# Patient Record
Sex: MALE | Race: WHITE | Employment: FULL TIME | URBAN - METROPOLITAN AREA
[De-identification: names, ages, dates, MRNs, and addresses within clinical notes are randomized per-mention and may not be internally consistent; named-entity substitution may affect disease eponyms.]

---

## 2017-02-20 ENCOUNTER — HOSPITAL ENCOUNTER (OUTPATIENT)
Dept: LAB | Age: 52
Discharge: HOME | End: 2017-02-20

## 2017-02-20 LAB
A-G RATIO,AGRAT: 2 RATIO (ref 1.1–2.6)
ALBUMIN SERPL-MCNC: 4.3 G/DL (ref 3.5–5)
ALP SERPL-CCNC: 87 U/L (ref 25–115)
ALT SERPL-CCNC: 24 U/L (ref 5–40)
ANION GAP SERPL CALC-SCNC: 16 MMOL/L
AST SERPL W P-5'-P-CCNC: 25 U/L (ref 10–37)
BILIRUB SERPL-MCNC: 0.3 MG/DL (ref 0.2–1.2)
BUN SERPL-MCNC: 12 MG/DL (ref 6–22)
CALCIUM SERPL-MCNC: 9.3 MG/DL (ref 8.4–10.4)
CHLORIDE SERPL-SCNC: 100 MMOL/L (ref 98–110)
CHOLEST SERPL-MCNC: 193 MG/DL (ref 110–200)
CO2 SERPL-SCNC: 25 MMOL/L (ref 20–32)
CREAT SERPL-MCNC: 0.8 MG/DL (ref 0.5–1.2)
GFRAA, 66117: >60
GFRNA, 66118: >60
GLOBULIN,GLOB: 2.1 G/DL (ref 2–4)
GLUCOSE SERPL-MCNC: 106 MG/DL (ref 65–99)
HDLC SERPL-MCNC: 34 MG/DL (ref 40–59)
LDLC SERPL CALC-MCNC: 113 MG/DL (ref 50–99)
POTASSIUM SERPL-SCNC: 4.6 MMOL/L (ref 3.5–5.5)
PROT SERPL-MCNC: 6.4 G/DL (ref 6.4–8.3)
SENTARA SPECIMEN COL,SENBCF: NORMAL
SODIUM SERPL-SCNC: 141 MMOL/L (ref 133–145)
TRIGL SERPL-MCNC: 232 MG/DL (ref 40–149)
VLDLC SERPL CALC-MCNC: 46 MG/DL (ref 8–30)

## 2017-02-20 PROCEDURE — 99001 SPECIMEN HANDLING PT-LAB: CPT | Performed by: INTERNAL MEDICINE

## 2017-02-27 ENCOUNTER — OFFICE VISIT (OUTPATIENT)
Dept: INTERNAL MEDICINE CLINIC | Age: 52
End: 2017-02-27

## 2017-02-27 VITALS
HEART RATE: 53 BPM | DIASTOLIC BLOOD PRESSURE: 58 MMHG | WEIGHT: 217 LBS | SYSTOLIC BLOOD PRESSURE: 117 MMHG | TEMPERATURE: 98.2 F | RESPIRATION RATE: 20 BRPM | HEIGHT: 68 IN | BODY MASS INDEX: 32.89 KG/M2 | OXYGEN SATURATION: 98 %

## 2017-02-27 DIAGNOSIS — E66.09 NON MORBID OBESITY DUE TO EXCESS CALORIES: ICD-10-CM

## 2017-02-27 DIAGNOSIS — E78.5 DYSLIPIDEMIA: ICD-10-CM

## 2017-02-27 DIAGNOSIS — E78.00 HIGH CHOLESTEROL: ICD-10-CM

## 2017-02-27 DIAGNOSIS — E55.9 VITAMIN D DEFICIENCY: ICD-10-CM

## 2017-02-27 DIAGNOSIS — Z12.5 SCREENING PSA (PROSTATE SPECIFIC ANTIGEN): ICD-10-CM

## 2017-02-27 DIAGNOSIS — I10 ESSENTIAL HYPERTENSION WITH GOAL BLOOD PRESSURE LESS THAN 140/90: ICD-10-CM

## 2017-02-27 DIAGNOSIS — F51.01 PRIMARY INSOMNIA: ICD-10-CM

## 2017-02-27 DIAGNOSIS — I10 ESSENTIAL HYPERTENSION: Primary | ICD-10-CM

## 2017-02-27 RX ORDER — GUAIFENESIN 100 MG/5ML
81 LIQUID (ML) ORAL DAILY
COMMUNITY
End: 2018-08-08

## 2017-02-27 RX ORDER — BISMUTH SUBSALICYLATE 262 MG
1 TABLET,CHEWABLE ORAL DAILY
COMMUNITY

## 2017-02-27 NOTE — PROGRESS NOTES
Patient is in the office today for a 6 month follow up. Patient states he is having trouble staying asleep. Patient states he has tried Melatonin, and  Advil PM and they do not help. Do you have an Advance Directive yes- will bring copy      1. Have you been to the ER, urgent care clinic since your last visit? Hospitalized since your last visit? No    2. Have you seen or consulted any other health care providers outside of the [address] [place] [address] since your last visit? Include any pap smears or colon screening.  No

## 2017-02-27 NOTE — PATIENT INSTRUCTIONS

## 2017-02-27 NOTE — MR AVS SNAPSHOT
Visit Information Date & Time Provider Department Dept. Phone Encounter #  
 2/27/2017  4:00 PM José Miguel Terrell MD [facility] at [facility] [id number] Follow-up Instructions Return in about 6 months (around 8/27/2017) for labs 1 week before. Upcoming Health Maintenance Date Due Hepatitis C Screening 1965 FOBT Q 1 YEAR AGE 50-75 1/25/2015 INFLUENZA AGE 9 TO ADULT 8/1/2016 DTaP/Tdap/Td series (2 - Td) 4/23/2022 Allergies as of 2/27/2017  Review Complete On: 2/27/2017 By: José Miguel Terrell MD  
  
 Severity Noted Reaction Type Reactions Statins-hmg-coa Reductase Inhibitors  04/13/2011    Myalgia Tricor [Fenofibrate Micronized]  12/02/2011    Myalgia Current Immunizations  Reviewed on 8/17/2012 Name Date PPD 8/8/2012 TDAP Vaccine 4/23/2012 Not reviewed this visit You Were Diagnosed With   
  
 Codes Comments Essential hypertension    -  Primary ICD-10-CM: I10 
ICD-9-CM: 401.9 Dyslipidemia     ICD-10-CM: E78.5 ICD-9-CM: 272.4 Primary insomnia     ICD-10-CM: F51.01 
ICD-9-CM: 307.42 Screening PSA (prostate specific antigen)     ICD-10-CM: Z12.5 ICD-9-CM: V76.44 Vitals BP  
  
  
  
  
  
 117/58 (BP 1 Location: Left arm, BP Patient Position: Sitting) Vitals History BMI and BSA Data Body Mass Index Body Surface Area  
 32.99 kg/m 2 2.17 m 2 Preferred Pharmacy Pharmacy Name Phone [facility] [address] [facility] [phone] Your Updated Medication List  
  
   
This list is accurate as of: 2/27/17  4:20 PM.  Always use your most recent med list.  
  
  
  
  
 aspirin 81 mg chewable tablet Take 81 mg by mouth daily. atenolol 50 mg tablet Commonly known as:  TENORMIN  
TAKE ONE TABLET BY MOUTH DAILY  
  
 multivitamin tablet Commonly known as:  ONE A DAY Take 1 Tab by mouth daily. VITAMIN D3 2,000 unit Tab Generic drug:  cholecalciferol (vitamin D3) Take  by mouth. Follow-up Instructions Return in about 6 months (around 8/27/2017) for labs 1 week before. To-Do List   
 02/27/2017 Lab:  PROSTATE SPECIFIC AG (PSA) Patient Instructions Heart-Healthy Diet: Care Instructions Your Care Instructions A heart-healthy diet has lots of vegetables, fruits, nuts, beans, and whole grains, and is low in salt. It limits foods that are high in saturated fat, such as meats, cheeses, and fried foods. It may be hard to change your diet, but even small changes can lower your risk of heart attack and heart disease. Follow-up care is a key part of your treatment and safety. Be sure to make and go to all appointments, and call your doctor if you are having problems. It's also a good idea to know your test results and keep a list of the medicines you take. How can you care for yourself at home? Watch your portions · Learn what a serving is. A \"serving\" and a \"portion\" are not always the same thing. Make sure that you are not eating larger portions than are recommended. For example, a serving of pasta is ½ cup. A serving size of meat is 2 to 3 ounces. A 3-ounce serving is about the size of a deck of cards. Measure serving sizes until you are good at [place]" them. Keep in mind that restaurants often serve portions that are 2 or 3 times the size of one serving. · To keep your energy level up and keep you from feeling hungry, eat often but in smaller portions. · Eat only the number of calories you need to stay at a healthy weight. If you need to lose weight, eat fewer calories than your body burns (through exercise and other physical activity). Eat more fruits and vegetables · Eat a variety of fruit and vegetables every day. Dark green, deep orange, red, or yellow fruits and vegetables are especially good for you. Examples include spinach, carrots, peaches, and berries. · Keep carrots, celery, and other veggies handy for snacks. Buy fruit that is in season and store it where you can see it so that you will be tempted to eat it. · Cook dishes that have a lot of veggies in them, such as stir-fries and soups. Limit saturated and trans fat · Read food labels, and try to avoid saturated and trans fats. They increase your risk of heart disease. Trans fat is found in many processed foods such as cookies and crackers. · Use olive or canola oil when you cook. Try cholesterol-lowering spreads, such as Benecol or Take Control. · Bake, broil, grill, or steam foods instead of frying them. · Choose lean meats instead of high-fat meats such as hot dogs and sausages. Cut off all visible fat when you prepare meat. · Eat fish, skinless poultry, and meat alternatives such as soy products instead of high-fat meats. Soy products, such as tofu, may be especially good for your heart. · Choose low-fat or fat-free milk and dairy products. Eat fish · Eat at least two servings of fish a week. Certain fish, such as salmon and tuna, contain omega-3 fatty acids, which may help reduce your risk of heart attack. Eat foods high in fiber · Eat a variety of grain products every day. Include whole-grain foods that have lots of fiber and nutrients. Examples of whole-grain foods include oats, whole wheat bread, and brown rice. · Buy whole-grain breads and cereals, instead of white bread or pastries. Limit salt and sodium · Limit how much salt and sodium you eat to help lower your blood pressure. · Taste food before you salt it. Add only a little salt when you think you need it. With time, your taste buds will adjust to less salt. · Eat fewer snack items, fast foods, and other high-salt, processed foods. Check food labels for the amount of sodium in packaged foods. · Choose low-sodium versions of canned goods (such as soups, vegetables, and beans). Limit sugar · Limit drinks and foods with added sugar. These include candy, desserts, and soda pop. Limit alcohol · Limit alcohol to no more than 2 drinks a day for men and 1 drink a day for women. Too much alcohol can cause health problems. When should you call for help? Watch closely for changes in your health, and be sure to contact your doctor if: 
· You would like help planning heart-healthy meals. Where can you learn more? Go to http://marita-jessica.info/. Enter V137 in the search box to learn more about \"Heart-Healthy Diet: Care Instructions. \" Current as of: January 27, 2016 Content Version: 11.1 © 1708-1612 [employer]. Care instructions adapted under license by [employer] (which disclaims liability or warranty for this information). If you have questions about a medical condition or this instruction, always ask your healthcare professional. [address] any warranty or liability for your use of this information. Introducing [facility]! Dear Alf Ramesh: 
Thank you for requesting a [employer] account. Our records indicate that you already have an active [employer] account. You can access your account anytime at [email or web address] Did you know that you can access your hospital and ER discharge instructions at any time in [employer]? You can also review all of your test results from your hospital stay or ER visit. Additional Information If you have questions, please visit the Frequently Asked Questions section of the [employer] website at [email or web address] Remember, [employer] is NOT to be used for urgent needs. For medical emergencies, dial 911. Now available from your iPhone and Android! Please provide this summary of care documentation to your next provider. Your primary care clinician is listed as CAROLEE BECERRA.  If you have any questions after today's visit, please call [phone].

## 2017-02-28 LAB — PSA SERPL-MCNC: 0.6 NG/ML

## 2017-02-28 NOTE — PROGRESS NOTES
Subjective:       Chief Complaint  The patient presents for follow up of hypertension and high cholesterol. Obesity, vit D deficiency         HPI  Mauricio Delgado is a 46 y.o. male seen for follow up of hyperlipidemia. Healso has hypertension. Hypertension well controlled, no significant medication side effects noted, on Tenormin, hyperlipidemia much improved with about 70 lb weight loss, pt unable to tolerate statins. He is currently losing weight in a weight loss program that he attends 1x/week and gets Vit B 12 injections and nutrition advise. Diet and Lifestyle: generally follows a low fat low cholesterol diet, exercises regularly    Home BP Monitoring: is not measured at home. Other symptoms and concerns: Pt's vit D level was improving on vit D 2000 units/day. Will check levels next OV    Discussed the patient's BMI with him. The BMI follow up plan is as follows: BMI is out of normal parameters and plan is as follows: I have counseled this patient on diet and exercise regimens. Current Outpatient Prescriptions   Medication Sig    multivitamin (ONE A DAY) tablet Take 1 Tab by mouth daily.  aspirin 81 mg chewable tablet Take 81 mg by mouth daily.  atenolol (TENORMIN) 50 mg tablet TAKE ONE TABLET BY MOUTH DAILY    cholecalciferol, vitamin D3, (VITAMIN D3) 2,000 unit tab Take  by mouth. No current facility-administered medications for this visit.               Review of Systems  Respiratory: negative for dyspnea on exertion  Cardiovascular: negative for chest pain    Objective:     Visit Vitals    /58 (BP 1 Location: Left arm, BP Patient Position: Sitting)    Pulse (!) 53    Temp 98.2 °F (36.8 °C) (Oral)    Resp 20    Ht 5' 8\" (1.727 m)    Wt 217 lb (98.4 kg)    SpO2 98%    BMI 32.99 kg/m2        General appearance - alert, well appearing, and in no distress  Neck - supple, no significant adenopathy, carotids upstroke normal bilaterally, no bruits  Chest - clear to auscultation, no wheezes, rales or rhonchi, symmetric air entry  Heart - normal rate, regular rhythm, normal S1, S2, no murmurs, rubs, clicks or gallops  Extremities - peripheral pulses normal, no pedal edema, no clubbing or cyanosis  Skin - normal coloration and turgor, no rashes, no suspicious skin lesions noted      Labs:   Lab Results   Component Value Date/Time    Cholesterol, total 193 02/20/2017 06:33 AM    HDL Cholesterol 34 02/20/2017 06:33 AM    LDL,Direct 146 02/15/2016 08:20 AM    LDL, calculated 113 02/20/2017 06:33 AM    Triglyceride 232 02/20/2017 06:33 AM     Lab Results   Component Value Date/Time    ALT (SGPT) 24 02/20/2017 06:33 AM    AST (SGOT) 25 02/20/2017 06:33 AM    Alk. phosphatase 87 02/20/2017 06:33 AM    Bilirubin, total 0.3 02/20/2017 06:33 AM     Lab Results   Component Value Date/Time    GFR est  06/29/2015 07:51 AM    GFR est non- 06/29/2015 07:51 AM    Creatinine 0.8 02/20/2017 06:33 AM    BUN 12 02/20/2017 06:33 AM    Sodium 141 02/20/2017 06:33 AM    Potassium 4.6 02/20/2017 06:33 AM    Chloride 100 02/20/2017 06:33 AM    CO2 25 02/20/2017 06:33 AM            Assessment / Plan     Hypertension well controlled, on tenormin   Hyperlipidemia improving with weight loss. Pt will continue weight loss program and try to lose a total of 100 lbs       ICD-10-CM ICD-9-CM    1. Essential hypertension I10 401.9    2. Dyslipidemia E78.5 272.4    3. Vitamin D deficiency E55.9 268.9 Has been improving on vit D 2000 units/day    4. Primary insomnia F51.01 307.42    5. Screening PSA (prostate specific antigen) Z12.5 V76.44 PROSTATE SPECIFIC AG (PSA)      PROSTATE SPECIFIC AG (PSA)   6. Non morbid obesity due to excess calories E66.09 278.00 Continues to improve with diet and exercise in a weight loss program.                Low cholesterol diet, weight control and daily exercise discussed.      Follow-up Disposition:  Return in about 6 months (around 8/27/2017) for labs 1 week before. Reviewed plan of care. Patient has provided input and agrees with goals.

## 2017-03-01 DIAGNOSIS — E55.9 VITAMIN D DEFICIENCY: ICD-10-CM

## 2017-08-01 LAB
25(OH)D3 SERPL-MCNC: 29.3 NG/ML (ref 32–100)
A-G RATIO,AGRAT: 2.1 RATIO (ref 1.1–2.6)
ALBUMIN SERPL-MCNC: 4.7 G/DL (ref 3.5–5)
ALP SERPL-CCNC: 70 U/L (ref 25–115)
ALT SERPL-CCNC: 30 U/L (ref 5–40)
ANION GAP SERPL CALC-SCNC: 15 MMOL/L
AST SERPL W P-5'-P-CCNC: 46 U/L (ref 10–37)
BILIRUB SERPL-MCNC: 0.6 MG/DL (ref 0.2–1.2)
BUN SERPL-MCNC: 22 MG/DL (ref 6–22)
CALCIUM SERPL-MCNC: 9.4 MG/DL (ref 8.4–10.4)
CHLORIDE SERPL-SCNC: 101 MMOL/L (ref 98–110)
CHOLEST SERPL-MCNC: 189 MG/DL (ref 110–200)
CO2 SERPL-SCNC: 24 MMOL/L (ref 20–32)
CREAT SERPL-MCNC: 0.7 MG/DL (ref 0.5–1.2)
GFRAA, 66117: >60
GFRNA, 66118: >60
GLOBULIN,GLOB: 2.2 G/DL (ref 2–4)
GLUCOSE SERPL-MCNC: 73 MG/DL (ref 65–99)
HDLC SERPL-MCNC: 44 MG/DL (ref 40–59)
LDLC SERPL CALC-MCNC: 115 MG/DL (ref 50–99)
POTASSIUM SERPL-SCNC: 4.2 MMOL/L (ref 3.5–5.5)
PROT SERPL-MCNC: 6.9 G/DL (ref 6.4–8.3)
SODIUM SERPL-SCNC: 140 MMOL/L (ref 133–145)
TRIGL SERPL-MCNC: 149 MG/DL (ref 40–149)
VLDLC SERPL CALC-MCNC: 30 MG/DL (ref 8–30)

## 2017-08-07 ENCOUNTER — TELEPHONE (OUTPATIENT)
Dept: INTERNAL MEDICINE CLINIC | Age: 52
End: 2017-08-07

## 2017-08-07 ENCOUNTER — OFFICE VISIT (OUTPATIENT)
Dept: INTERNAL MEDICINE CLINIC | Age: 52
End: 2017-08-07

## 2017-08-07 VITALS
BODY MASS INDEX: 32.58 KG/M2 | DIASTOLIC BLOOD PRESSURE: 71 MMHG | HEART RATE: 56 BPM | OXYGEN SATURATION: 97 % | TEMPERATURE: 98 F | SYSTOLIC BLOOD PRESSURE: 119 MMHG | RESPIRATION RATE: 18 BRPM | HEIGHT: 68 IN | WEIGHT: 215 LBS

## 2017-08-07 DIAGNOSIS — I10 ESSENTIAL HYPERTENSION: Primary | ICD-10-CM

## 2017-08-07 DIAGNOSIS — E55.9 VITAMIN D DEFICIENCY: ICD-10-CM

## 2017-08-07 DIAGNOSIS — E78.00 HIGH CHOLESTEROL: ICD-10-CM

## 2017-08-07 NOTE — PROGRESS NOTES
Patient is in the office today for a 6 month follow up. Patient states he is cutting Toprol in half and taking 25 mg daily. 1. Have you been to the ER, urgent care clinic since your last visit? Hospitalized since your last visit? No    2. Have you seen or consulted any other health care providers outside of the [address] [place] [address] since your last visit? Include any pap smears or colon screening.  No

## 2017-08-07 NOTE — PROGRESS NOTES
Subjective:       Chief Complaint  The patient presents for follow up of hypertension and high cholesterol. Obesity, vit D deficiency         HPI  Zoraida Ibrahim is a 46 y.o. male seen for follow up of hyperlipidemia. Raj has hypertension. Hypertension well controlled, no significant medication side effects noted, on Tenormin 50 mg except for mild dizziness and his HR is in the 50's, hyperlipidemia much improved with about 70 lb weight loss, pt unable to tolerate statins. He is currently losing weight in a weight loss program that he attends 1x/week and gets Vit B 12 injections and nutrition advise. Diet and Lifestyle: generally follows a low fat low cholesterol diet, exercises regularly    Home BP Monitoring: is not measured at home. Other symptoms and concerns: Pt's vit D level was improving on vit D 2000 units/day. Will check levels next OV. Pt c/o neck pain. Will try Home exercises. If not improving will f/u for revaluation. Will also consider going to a chiropractor. Discussed the patient's BMI with him. The BMI follow up plan is as follows: BMI is out of normal parameters and plan is as follows: I have counseled this patient on diet and exercise regimens. Current Outpatient Prescriptions   Medication Sig    multivitamin (ONE A DAY) tablet Take 1 Tab by mouth daily.  aspirin 81 mg chewable tablet Take 81 mg by mouth daily.  atenolol (TENORMIN) 50 mg tablet TAKE ONE TABLET BY MOUTH DAILY    cholecalciferol, vitamin D3, (VITAMIN D3) 2,000 unit tab Take  by mouth. No current facility-administered medications for this visit.               Review of Systems  Respiratory: negative for dyspnea on exertion  Cardiovascular: negative for chest pain    Objective:     Visit Vitals    /71 (BP 1 Location: Left arm, BP Patient Position: Sitting)    Pulse (!) 56    Temp 98 °F (36.7 °C) (Oral)    Resp 18    Ht 5' 8\" (1.727 m)    Wt 215 lb (97.5 kg)    SpO2 97%    BMI 32.69 kg/m2 General appearance - alert, well appearing, and in no distress  Neck - supple, no significant adenopathy, carotids upstroke normal bilaterally, no bruits  Chest - clear to auscultation, no wheezes, rales or rhonchi, symmetric air entry  Heart - normal rate, regular rhythm, normal S1, S2, no murmurs, rubs, clicks or gallops  Extremities - peripheral pulses normal, no pedal edema, no clubbing or cyanosis  Skin - normal coloration and turgor, no rashes, no suspicious skin lesions noted      Labs:   Lab Results   Component Value Date/Time    Cholesterol, total 189 08/01/2017 01:54 AM    HDL Cholesterol 44 08/01/2017 01:54 AM    LDL,Direct 146 02/15/2016 08:20 AM    LDL, calculated 115 08/01/2017 01:54 AM    Triglyceride 149 08/01/2017 01:54 AM     Lab Results   Component Value Date/Time    ALT (SGPT) 30 08/01/2017 01:54 AM    AST (SGOT) 46 08/01/2017 01:54 AM    Alk. phosphatase 70 08/01/2017 01:54 AM    Bilirubin, total 0.6 08/01/2017 01:54 AM     Lab Results   Component Value Date/Time    GFR est  06/29/2015 07:51 AM    GFR est non- 06/29/2015 07:51 AM    Creatinine 0.7 08/01/2017 01:54 AM    BUN 22 08/01/2017 01:54 AM    Sodium 140 08/01/2017 01:54 AM    Potassium 4.2 08/01/2017 01:54 AM    Chloride 101 08/01/2017 01:54 AM    CO2 24 08/01/2017 01:54 AM            Assessment / Plan     Hypertension well controlled, on tenormin. Will stop tenormin and see if BP remains controlled off meds. Hyperlipidemia improving with weight loss. Pt will continue weight loss program and try to lose 20 more lbs. Pt will consider doing Heart Scan      ICD-10-CM ICD-9-CM    1. Essential hypertension I10 401.9    2. High cholesterol E78.00 272.0    3. Vitamin D deficiency E55.9 268.9 Well controlled on vit D 2000 units/day                Low cholesterol diet, weight control and daily exercise discussed. Follow-up Disposition:  Return in about 6 months (around 2/7/2018) for labs 1 week before.       Reviewed plan of care. Patient has provided input and agrees with goals.

## 2017-08-07 NOTE — TELEPHONE ENCOUNTER
Pt will be coming back in February 2018 and getting labs done offsite (possibly [facility]). Please enter labs.

## 2017-08-07 NOTE — MR AVS SNAPSHOT
Visit Information Date & Time Provider Department Dept. Phone Encounter #  
 8/7/2017 10:15 AM Ashu Shin MD [facility] [phone] Follow-up Instructions Return in about 6 months (around 2/7/2018) for labs 1 week before. Upcoming Health Maintenance Date Due Hepatitis C Screening 1965 FOBT Q 1 YEAR AGE 50-75 1/25/2015 INFLUENZA AGE 9 TO ADULT 8/1/2017 DTaP/Tdap/Td series (2 - Td) 4/23/2022 Allergies as of 8/7/2017  Review Complete On: 8/7/2017 By: Ashu Shin MD  
  
 Severity Noted Reaction Type Reactions Statins-hmg-coa Reductase Inhibitors  04/13/2011    Myalgia Tricor [Fenofibrate Micronized]  12/02/2011    Myalgia Current Immunizations  Reviewed on 8/17/2012 Name Date PPD 8/8/2012 TDAP Vaccine 4/23/2012 Not reviewed this visit You Were Diagnosed With   
  
 Codes Comments Essential hypertension    -  Primary ICD-10-CM: I10 
ICD-9-CM: 401.9 High cholesterol     ICD-10-CM: E78.00 ICD-9-CM: 272.0 Vitamin D deficiency     ICD-10-CM: E55.9 ICD-9-CM: 268.9 Vitals BP Pulse Temp Resp Height(growth percentile) Weight(growth percentile) 119/71 (BP 1 Location: Left arm, BP Patient Position: Sitting) (!) 56 98 °F (36.7 °C) (Oral) 18 5' 8\" (1.727 m) 215 lb (97.5 kg) SpO2 BMI Smoking Status 97% 32.69 kg/m2 Never Smoker Vitals History BMI and BSA Data Body Mass Index Body Surface Area  
 32.69 kg/m 2 2.16 m 2 Preferred Pharmacy Pharmacy Name Phone [facility] [address] [facility] [phone] Your Updated Medication List  
  
   
This list is accurate as of: 8/7/17 10:36 AM.  Always use your most recent med list.  
  
  
  
  
 aspirin 81 mg chewable tablet Take 81 mg by mouth daily. atenolol 50 mg tablet Commonly known as:  TENORMIN  
TAKE ONE TABLET BY MOUTH DAILY  
  
 multivitamin tablet Commonly known as:  ONE A DAY Take 1 Tab by mouth daily. VITAMIN D3 2,000 unit Tab Generic drug:  cholecalciferol (vitamin D3) Take  by mouth. Follow-up Instructions Return in about 6 months (around 2/7/2018) for labs 1 week before. Patient Instructions Advance Directives: Care Instructions Your Care Instructions An advance directive is a legal way to state your wishes at the end of your life. It tells your family and your doctor what to do if you can no longer say what you want. There are two main types of advance directives. You can change them any time that your wishes change. · A living will tells your family and your doctor your wishes about life support and other treatment. · A durable power of  for health care lets you name a person to make treatment decisions for you when you can't speak for yourself. This person is called a health care agent. If you do not have an advance directive, decisions about your medical care may be made by a doctor or a  who doesn't know you. It may help to think of an advance directive as a gift to the people who care for you. If you have one, they won't have to make tough decisions by themselves. Follow-up care is a key part of your treatment and safety. Be sure to make and go to all appointments, and call your doctor if you are having problems. It's also a good idea to know your test results and keep a list of the medicines you take. How can you care for yourself at home? · Discuss your wishes with your loved ones and your doctor. This way, there are no surprises. · Many states have a unique form. Or you might use a universal form that has been approved by many states. This kind of form can sometimes be completed and stored online. Your electronic copy will then be available wherever you have a connection to the Internet.  In most cases, doctors will respect your wishes even if you have a form from a different state. · You don't need a  to do an advance directive. But you may want to get legal advice. · Think about these questions when you prepare an advance directive: ¨ Who do you want to make decisions about your medical care if you are not able to? Many people choose a family member or close friend. ¨ Do you know enough about life support methods that might be used? If not, talk to your doctor so you understand. ¨ What are you most afraid of that might happen? You might be afraid of having pain, losing your independence, or being kept alive by machines. ¨ Where would you prefer to die? Choices include your home, a hospital, or a nursing home. ¨ Would you like to have information about hospice care to support you and your family? ¨ Do you want to donate organs when you die? ¨ Do you want certain [employer] practices performed before you die? If so, put your wishes in the advance directive. · Read your advance directive every year, and make changes as needed. When should you call for help? Be sure to contact your doctor if you have any questions. Where can you learn more? Go to http://marita-jessica.info/. Enter R264 in the search box to learn more about \"Advance Directives: Care Instructions. \" Current as of: November 17, 2016 Content Version: 11.3 © 4941-3288 [employer]. Care instructions adapted under license by [employer] (which disclaims liability or warranty for this information). If you have questions about a medical condition or this instruction, always ask your healthcare professional. [place] [address] any warranty or liability for your use of this information. Neck: Exercises Your Care Instructions Here are some examples of typical rehabilitation exercises for your condition. Start each exercise slowly. Ease off the exercise if you start to have pain. Your doctor or physical therapist will tell you when you can start these exercises and which ones will work best for you. How to do the exercises Note: Stretching should make you feel a gentle stretch, but no pain. Stop any strengthening exercise that makes pain worse. Neck stretch 1. This stretch works best if you keep your shoulder down as you lean away from it. To help you remember to do this, start by relaxing your shoulders and lightly holding on to your thighs or your chair. 2. Tilt your head toward your shoulder and hold for 15 to 30 seconds. Let the weight of your head stretch your muscles. 3. If you would like a little added stretch, use your hand to gently and steadily pull your head toward your shoulder. For example, keeping your right shoulder down, lean your head to the left. 4. Repeat 2 to 4 times toward each shoulder. Diagonal neck stretch 1. Turn your head slightly toward the direction you will be stretching, and tilt your head diagonally toward your chest and hold for 15 to 30 seconds. 2. If you would like a little added stretch, use your hand to gently and steadily pull your head forward on the diagonal. 
3. Repeat 2 to 4 times toward each side. Dorsal glide stretch 1. Sit or stand tall and look straight ahead. 2. Slowly tuck your chin as you glide your head backward over your body 3. Hold for a count of 6, and then relax for up to 10 seconds. 4. Repeat 8 to 12 times. Note: The dorsal glide stretches the back of the neck. If you feel pain, do not glide so far back. Some people find this exercise easier to do while lying on their backs with an ice pack on the neck. Chest and shoulder stretch 1. Sit or stand tall and glide your head backward as in the dorsal glide stretch. 2. Raise both arms so that your hands are next to your ears. 3. Take a deep breath, and as you breathe out, lower your elbows down and behind your back.  You will feel your shoulder blades slide down and together, and at the same time you will feel a stretch across your chest and the front of your shoulders. 4. Hold for about 6 seconds, and then relax for up to 10 seconds. 5. Repeat 8 to 12 times. Strengthening: Hands on head 1. Move your head backward, forward, and side to side against gentle pressure from your hands, holding each position for about 6 seconds. 2. Repeat 8 to 12 times. Follow-up care is a key part of your treatment and safety. Be sure to make and go to all appointments, and call your doctor if you are having problems. It's also a good idea to know your test results and keep a list of the medicines you take. Where can you learn more? Go to http://marita-jessica.info/. Enter P975 in the search box to learn more about \"Neck: Exercises. \" Current as of: March 21, 2017 Content Version: 11.3 © 5373-1625 [employer]. Care instructions adapted under license by [employer] (which disclaims liability or warranty for this information). If you have questions about a medical condition or this instruction, always ask your healthcare professional. [address] any warranty or liability for your use of this information. Rotator Cuff: Exercises Your Care Instructions Here are some examples of typical rehabilitation exercises for your condition. Start each exercise slowly. Ease off the exercise if you start to have pain. Your doctor or physical therapist will tell you when you can start these exercises and which ones will work best for you. How to do the exercises Pendulum swing Note: If you have pain in your back, do not do this exercise. 5. Hold on to a table or the back of a chair with your good arm. Then bend forward a little and let your sore arm hang straight down. This exercise does not use the arm muscles. Rather, use your legs and your hips to create movement that makes your arm swing freely. 6. Use the movement from your hips and legs to guide the slightly swinging arm back and forth like a pendulum (or elephant trunk). Then guide it in circles that start small (about the size of a dinner plate). Make the circles a bit larger each day, as your pain allows. 7. Do this exercise for 5 minutes, 5 to 7 times each day. 8. As you have less pain, try bending over a little farther to do this exercise. This will increase the amount of movement at your shoulder. Posterior stretching exercise 4. Hold the elbow of your injured arm with your other hand. 5. Use your hand to pull your injured arm gently up and across your body. You will feel a gentle stretch across the back of your injured shoulder. 6. Hold for at least 15 to 30 seconds. Then slowly lower your arm. 7. Repeat 2 to 4 times. Up-the-back stretch Note: Your doctor or physical therapist may want you to wait to do this stretch until you have regained most of your range of motion and strength. You can do this stretch in different ways. Hold any of these stretches for at least 15 to 30 seconds. Repeat them 2 to 4 times. 5. Put your hand in your back pocket. Let it rest there to stretch your shoulder. 6. With your other hand, hold your injured arm (palm outward) behind your back by the wrist. Pull your arm up gently to stretch your shoulder. 7. Next, put a towel over your other shoulder. Put the hand of your injured arm behind your back. Now hold the back end of the towel. With the other hand, hold the front end of the towel in front of your body. Pull gently on the front end of the towel. This will bring your hand farther up your back to stretch your shoulder. Overhead stretch 6. Standing about an arm's length away, grasp onto a solid surface. You could use a countertop, a doorknob, or the back of a sturdy chair. 7. With your knees slightly bent, bend forward with your arms straight. Lower your upper body, and let your shoulders stretch. 8. As your shoulders are able to stretch farther, you may need to take a step or two backward. 9. Hold for at least 15 to 30 seconds. Then stand up and relax. If you had stepped back during your stretch, step forward so you can keep your hands on the solid surface. 10. Repeat 2 to 4 times. Shoulder flexion (lying down) Note: To make a wand for this exercise, use a piece of PVC pipe or a broom handle with the broom removed. Make the wand about a foot wider than your shoulders. 3. Lie on your back, holding a wand with both hands. Your palms should face down as you hold the wand. 4. Keeping your elbows straight, slowly raise your arms over your head. Raise them until you feel a stretch in your shoulders, upper back, and chest. 
5. Hold for 15 to 30 seconds. 6. Repeat 2 to 4 times. Shoulder rotation (lying down) Note: To make a wand for this exercise, use a piece of PVC pipe or a broom handle with the broom removed. Make the wand about a foot wider than your shoulders. 1. Lie on your back. Hold a wand with both hands with your elbows bent and palms up. 2. Keep your elbows close to your body, and move the wand across your body toward the sore arm. 3. Hold for 8 to 12 seconds. 4. Repeat 2 to 4 times. Wall climbing (to the side) Note: Avoid any movement that is straight to your side, and be careful not to arch your back. Your arm should stay about 30 degrees to the front of your side. 1. Stand with your side to a wall so that your fingers can just touch it at an angle about 30 degrees toward the front of your body. 2. Walk the fingers of your injured arm up the wall as high as pain permits. Try not to shrug your shoulder up toward your ear as you move your arm up. 3. Hold that position for a count of at least 15 to 20. 
4. Walk your fingers back down to the starting position. 5. Repeat at least 2 to 4 times. Try to reach higher each time. Wall climbing (to the front) Note: During this stretching exercise, be careful not to arch your back. 1. Face a wall, and stand so your fingers can just touch it. 2. Keeping your shoulder down, walk the fingers of your injured arm up the wall as high as pain permits. (Don't shrug your shoulder up toward your ear.) 3. Hold your arm in that position for at least 15 to 30 seconds. 4. Slowly walk your fingers back down to where you started. 5. Repeat at least 2 to 4 times. Try to reach higher each time. Shoulder blade squeeze 1. Stand with your arms at your sides, and squeeze your shoulder blades together. Do not raise your shoulders up as you squeeze. 2. Hold 6 seconds. 3. Repeat 8 to 12 times. Scapular exercise: Arm reach 1. Lie flat on your back. This exercise is a very slight motion that starts with your arms raised (elbows straight, arms straight). 2. From this position, reach higher toward the snow or ceiling. Keep your elbows straight. All motion should be from your shoulder blade only. 3. Relax your arms back to where you started. 4. Repeat 8 to 12 times. Arm raise to the side Note: During this strengthening exercise, your arm should stay about 30 degrees to the front of your side. 1. Slowly raise your injured arm to the side, with your thumb facing up. Raise your arm 60 degrees at the most (shoulder level is 90 degrees). 2. Hold the position for 3 to 5 seconds. Then lower your arm back to your side. If you need to, bring your \"good\" arm across your body and place it under the elbow as you lower your injured arm. Use your good arm to keep your injured arm from dropping down too fast. 
3. Repeat 8 to 12 times. 4. When you first start out, don't hold any extra weight in your hand. As you get stronger, you may use a 1-pound to 2-pound dumbbell or a small can of food. Shoulder flexor and extensor exercise Note: These are isometric exercises. That means you contract your muscles without actually moving. · Push forward (flex): Stand facing a wall or doorjamb, about 6 inches or less back. Hold your injured arm against your body. Make a closed fist with your thumb on top. Then gently push your hand forward into the wall with about 25% to 50% of your strength. Don't let your body move backward as you push. Hold for about 6 seconds. Relax for a few seconds. Repeat 8 to 12 times. · Push backward (extend): Stand with your back flat against a wall. Your upper arm should be against the wall, with your elbow bent 90 degrees (your hand straight ahead). Push your elbow gently back against the wall with about 25% to 50% of your strength. Don't let your body move forward as you push. Hold for about 6 seconds. Relax for a few seconds. Repeat 8 to 12 times. Scapular exercise: Wall push-ups Note: This exercise is best done with your fingers somewhat turned out, rather than straight up and down. 1. Stand facing a wall, about 12 inches to 18 inches away. 2. Place your hands on the wall at shoulder height. 3. Slowly bend your elbows and bring your face to the wall. Keep your back and hips straight. 4. Push back to where you started. 5. Repeat 8 to 12 times. 6. When you can do this exercise against a wall comfortably, you can try it against a counter. You can then slowly progress to the end of a couch, then to a sturdy chair, and finally to the floor. Scapular exercise: Retraction Note: For this exercise, you will need elastic exercise material, such as surgical tubing or Thera-Band. 1. Put the band around a solid object at about waist level. (A bedpost will work well.) Each hand should hold an end of the band. 2. With your elbows at your sides and bent to 90 degrees, pull the band back. Your shoulder blades should move toward each other. Then move your arms back where you started. 3. Repeat 8 to 12 times.  
4. If you have good range of motion in your shoulders, try this exercise with your arms lifted out to the sides. Keep your elbows at a 90-degree angle. Raise the elastic band up to about shoulder level. Pull the band back to move your shoulder blades toward each other. Then move your arms back where you started. Internal rotator strengthening exercise 1. Start by tying a piece of elastic exercise material to a doorknob. You can use surgical tubing or Thera-Band. 2. Stand or sit with your shoulder relaxed and your elbow bent 90 degrees. Your upper arm should rest comfortably against your side. Squeeze a rolled towel between your elbow and your body for comfort. This will help keep your arm at your side. 3. Hold one end of the elastic band in the hand of the painful arm. 4. Slowly rotate your forearm toward your body until it touches your belly. Slowly move it back to where you started. 5. Keep your elbow and upper arm firmly tucked against the towel roll or at your side. 6. Repeat 8 to 12 times. External rotator strengthening exercise 1. Start by tying a piece of elastic exercise material to a doorknob. You can use surgical tubing or Thera-Band. (You may also hold one end of the band in each hand.) 2. Stand or sit with your shoulder relaxed and your elbow bent 90 degrees. Your upper arm should rest comfortably against your side. Squeeze a rolled towel between your elbow and your body for comfort. This will help keep your arm at your side. 3. Hold one end of the elastic band with the hand of the painful arm. 4. Start with your forearm across your belly. Slowly rotate the forearm out away from your body. Keep your elbow and upper arm tucked against the towel roll or the side of your body until you begin to feel tightness in your shoulder. Slowly move your arm back to where you started. 5. Repeat 8 to 12 times. Follow-up care is a key part of your treatment and safety.  Be sure to make and go to all appointments, and call your doctor if you are having problems. It's also a good idea to know your test results and keep a list of the medicines you take. Where can you learn more? Go to http://marita-jessica.info/. Enter Chencho Ponce in the search box to learn more about \"Rotator Cuff: Exercises. \" Current as of: March 21, 2017 Content Version: 11.3 © 6020-6094 [employer]. Care instructions adapted under license by [employer] (which disclaims liability or warranty for this information). If you have questions about a medical condition or this instruction, always ask your healthcare professional. [place] [address] any warranty or liability for your use of this information. Introducing [facility]! Dear [employer]: 
Thank you for requesting a [employer] account. Our records indicate that you already have an active Root Orange account. You can access your account anytime at [email or web address]. [email or web address]/[employer] Did you know that you can access your hospital and ER discharge instructions at any time in Root Orange? You can also review all of your test results from your hospital stay or ER visit. Additional Information If you have questions, please visit the Frequently Asked Questions section of the Root Orange website at [email or web address] Remember, Root Orange is NOT to be used for urgent needs. For medical emergencies, dial 911. Now available from your iPhone and Android! Please provide this summary of care documentation to your next provider. Your primary care clinician is listed as CAROLEE BECERRA. If you have any questions after today's visit, please call [phone].

## 2017-10-30 LAB — HEMOCCULT STL QL: NEGATIVE

## 2018-08-08 ENCOUNTER — HOSPITAL ENCOUNTER (OUTPATIENT)
Dept: LAB | Age: 53
Discharge: HOME | End: 2018-08-08

## 2018-08-08 ENCOUNTER — OFFICE VISIT (OUTPATIENT)
Dept: ORTHOPEDIC SURGERY | Age: 53
End: 2018-08-08

## 2018-08-08 ENCOUNTER — HOSPITAL ENCOUNTER (OUTPATIENT)
Dept: PREADMISSION TESTING | Age: 53
Discharge: HOME | End: 2018-08-08
Payer: COMMERCIAL

## 2018-08-08 ENCOUNTER — HOSPITAL ENCOUNTER (OUTPATIENT)
Dept: GENERAL RADIOLOGY | Age: 53
Discharge: HOME | End: 2018-08-08
Payer: COMMERCIAL

## 2018-08-08 VITALS
BODY MASS INDEX: 39.25 KG/M2 | TEMPERATURE: 98.2 F | HEART RATE: 74 BPM | WEIGHT: 259 LBS | OXYGEN SATURATION: 97 % | RESPIRATION RATE: 16 BRPM | SYSTOLIC BLOOD PRESSURE: 163 MMHG | HEIGHT: 68 IN | DIASTOLIC BLOOD PRESSURE: 92 MMHG

## 2018-08-08 DIAGNOSIS — Z01.810 PRE-OPERATIVE CARDIOVASCULAR EXAMINATION: ICD-10-CM

## 2018-08-08 DIAGNOSIS — M25.511 CHRONIC RIGHT SHOULDER PAIN: ICD-10-CM

## 2018-08-08 DIAGNOSIS — M65.311 TRIGGER THUMB OF RIGHT HAND: Primary | ICD-10-CM

## 2018-08-08 DIAGNOSIS — Z01.811 PRE-OPERATIVE RESPIRATORY EXAMINATION: ICD-10-CM

## 2018-08-08 DIAGNOSIS — M65.311 TRIGGER THUMB OF RIGHT HAND: ICD-10-CM

## 2018-08-08 DIAGNOSIS — M65.311 TRIGGER FINGER OF RIGHT THUMB: Primary | ICD-10-CM

## 2018-08-08 DIAGNOSIS — Z01.812 BLOOD TESTS PRIOR TO TREATMENT OR PROCEDURE: ICD-10-CM

## 2018-08-08 DIAGNOSIS — G89.29 CHRONIC RIGHT SHOULDER PAIN: ICD-10-CM

## 2018-08-08 PROBLEM — E66.01 SEVERE OBESITY (BMI 35.0-39.9): Status: ACTIVE | Noted: 2018-08-08

## 2018-08-08 LAB
ATRIAL RATE: 72 BPM
CALCULATED P AXIS, ECG09: 38 DEGREES
CALCULATED R AXIS, ECG10: -6 DEGREES
CALCULATED T AXIS, ECG11: 29 DEGREES
DIAGNOSIS, 93000: NORMAL
P-R INTERVAL, ECG05: 176 MS
Q-T INTERVAL, ECG07: 378 MS
QRS DURATION, ECG06: 84 MS
QTC CALCULATION (BEZET), ECG08: 413 MS
SENTARA SPECIMEN COL,SENBCF: NORMAL
VENTRICULAR RATE, ECG03: 72 BPM

## 2018-08-08 PROCEDURE — 71046 X-RAY EXAM CHEST 2 VIEWS: CPT

## 2018-08-08 PROCEDURE — 99001 SPECIMEN HANDLING PT-LAB: CPT | Performed by: ORTHOPAEDIC SURGERY

## 2018-08-08 PROCEDURE — 93005 ELECTROCARDIOGRAM TRACING: CPT

## 2018-08-08 RX ORDER — BETAMETHASONE SODIUM PHOSPHATE AND BETAMETHASONE ACETATE 3; 3 MG/ML; MG/ML
6 INJECTION, SUSPENSION INTRA-ARTICULAR; INTRALESIONAL; INTRAMUSCULAR; SOFT TISSUE ONCE
Qty: 0.5 ML | Refills: 0
Start: 2018-08-08 | End: 2018-08-08

## 2018-08-08 RX ORDER — BUPIVACAINE HYDROCHLORIDE 2.5 MG/ML
0.5 INJECTION, SOLUTION EPIDURAL; INFILTRATION; INTRACAUDAL ONCE
Qty: 0.5 ML | Refills: 0
Start: 2018-08-08 | End: 2018-08-08

## 2018-08-08 NOTE — PROGRESS NOTES
Patient: Eva Morelos                MRN: [id number]       SSN: [id number]  YOB: 1965        AGE: 48 y.o. SEX: male    PCP: Yonathan Lynn MD  08/08/18    Chief Complaint   Patient presents with    Shoulder Pain     right shoudler pain    Finger Pain     right thumb pain     HISTORY:  Eva Morelos is a 48 y.o. male who is seen for right shoulder and right thumb pain. He has been experiencing pain for the past 3 weeks. He reports no h/o injury. He is right handed. He is s/p a right CTR by Dr. Addy Funes on 8-12-15 - doing well. He reports some catching and popping in his thumb that has gradually gotten worse. His thumb is currently locked in extension. He reports pain in his metacarpophalangeal joint in his right thumb. He noted shoulder pain with overhead activities and at night but his shoulder is feeling some better. He previously responded to a right shoulder injection. 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 He lives with his wife in [place]. He does not have any children. He does not play any sports. Current weight is 259 pounds. He is 5'8\" tall.       No results found for: HBA1C, HGBE8, INK0GBPH, PMG6JCLB, EWE7LSIF  Weight Metrics 8/8/2018 8/7/2017 2/27/2017 8/30/2016 4/4/2016 3/1/2016 7/6/2015   Weight 259 lb 215 lb 217 lb 247 lb 280 lb 279 lb 283 lb   BMI 39.38 kg/m2 32.69 kg/m2 32.99 kg/m2 37.56 kg/m2 42.58 kg/m2 42.43 kg/m2 43.04 kg/m2       Patient Active Problem List   Diagnosis Code    Hypertension I10    VENTRAL HERNIA     High cholesterol E78.00    High triglycerides E78.1    Obesity E66.9    Bilateral carpal tunnel syndrome G56.03    Vitamin D deficiency E55.9    Dyslipidemia E78.5    Severe obesity (BMI 35.0-39.9) ([facility]) E66.01     REVIEW OF SYSTEMS: All Below are Negative except: See HPI   Constitutional: negative for fever, chills, and weight loss. Cardiovascular: negative for chest pain, claudication, leg swelling, SOB, FROST   Gastrointestinal: Negative for pain, N/V/C/D, Blood in stool or urine, dysuria,  hematuria, incontinence, pelvic pain. Musculoskeletal: See HPI   Neurological: Negative for dizziness and weakness. Negative for headaches, Visual changes, confusion, seizures   Phychiatric/Behavioral: Negative for depression, memory loss, substance  abuse. Extremities: Negative for hair changes, rash, or skin lesion changes. Hematologic: Negative for bleeding problems, bruising, pallor or swollen lymph  nodes   Peripheral Vascular: No calf pain, no circulation deficits. Social History     Social History    Marital status:      Spouse name: N/A    Number of children: N/A    Years of education: N/A     Occupational History    Not on file. Social History Main Topics    Smoking status: Never Smoker    Smokeless tobacco: Never Used    Alcohol use No    Drug use: No    Sexual activity: Yes     Partners: Female     Other Topics Concern    Not on file     Social History Narrative      Allergies   Allergen Reactions    Statins-Hmg-Coa Reductase Inhibitors Myalgia    Tricor [Fenofibrate Micronized] Myalgia      Current Outpatient Prescriptions   Medication Sig    betamethasone (CELESTONE SOLUSPAN) 6 mg/mL injection 1 mL by Intra artICUlar route once for 1 dose.  bupivacaine, PF, (MARCAINE, PF,) 0.25 % (2.5 mg/mL) injection 0.5 mL by Intra artICUlar route once for 1 dose.  multivitamin (ONE A DAY) tablet Take 1 Tab by mouth daily.  cholecalciferol, vitamin D3, (VITAMIN D3) 2,000 unit tab Take  by mouth.      No current facility-administered medications for this visit.       PHYSICAL EXAMINATION:  Visit Vitals    BP (!) 163/92    Pulse 74    Temp 98.2 °F (36.8 °C) (Oral)    Resp 16    Ht 5' 8\" (1.727 m)    Wt 259 lb (117.5 kg)    SpO2 97%    BMI 39.38 kg/m2      PHYSICAL EXAM:  Visit Vitals    BP (!) 163/92    Pulse 74    Temp 98.2 °F (36.8 °C) (Oral)    Resp 16    Ht 5' 8\" (1.727 m)    Wt 259 lb (117.5 kg)    SpO2 97%    BMI 39.38 kg/m2     Appearance: Alert, well appearing and pleasant patient who is in no distress, oriented to person, place/time, and who follows commands. HEENT: Alan Hunter hears well, does not require hearing aids. His sclera of the eyes are non-icteric. He is breathing normally and no respiratory accessory muscle use is noted. No JVD present and Neck ROM within normal limits. Psychiatric: Affect and mood are appropriate. Oriented x3  Heart[de-identified]  S1, S2 without murmer, regular rhythm  Lungs:  Breath sounds clear to auscultation  Cardiovascular/Peripheral Vascular: Normal pulses to each foot. Integumentary: No rashes,  wounds, or abrasions. Warm and normal color. No drainage.    Gait: normal  Sensory Exam: Intact/Normal Sensation    Lymphatic: No evidence of Lymphedema  Vascular:       Pulses: palpable  Varicosities none  Wounds/Abrasion: None Present  Neuro: Negative, no tremors  ORTHO EXAMINATION:  Examination Right shoulder Left shoulder   Skin Intact Intact   Effusion - -   Biceps deformity - -   Atrophy - -   AC joint tenderness - -   Acromial tenderness + +   Biceps tenderness - -   Forward flexion/Elevation  170   Active abduction  160   External rotation ROM 30 30   Internal rotation ROM 90 90   Apprehension - -   Impingement - -   Drop Arm Test - -   Neurovascular Intact Intact     Examination Right Hand Left Hand   Skin Intact Intact   Deformity - -   Swelling - -   Tenderness - -   Finger flexion Full Full   Finger extension Full Full   Sensation Normal Normal   Capillary refill Normal Normal   Heberden's nodes - -   Dupuytren's - -   Right thumb locked in extension    PROCEDURE:  After discussing treatment options, patient's right thumb A1 pulley was injected with 1/2 cc Marcaine and 1/2 cc Celestone. Chart reviewed for the following:   Lucinda Vail MD, have reviewed the History, Physical and updated the Allergic reactions for [address] performed immediately prior to start of procedure:  Lucinda Vail MD, have performed the following reviews on Pato Serna prior to the start of the procedure:            * Patient was identified by name and date of birth   * Agreement on procedure being performed was verified  * Risks and Benefits explained to the patient  * Procedure site verified and marked as necessary  * Patient was positioned for comfort  * Consent was obtained     Time: 10:33 AM     Date of procedure: 8/8/2018    Procedure performed by:  Kwasi Kinsey MD    Mr. Nicolas Pineda tolerated the procedure well with no complications. IMPRESSION:      ICD-10-CM ICD-9-CM    1. Trigger finger of right thumb M65.311 727.03 betamethasone (CELESTONE SOLUSPAN) 6 mg/mL injection      BETAMETHASONE ACETATE & SODIUM PHOSPHATE INJECTION 3 MG EA.      bupivacaine, PF, (MARCAINE, PF,) 0.25 % (2.5 mg/mL) injection      AR INJECT TENDON SHEATH/LIGAMENT   2. Chronic right shoulder pain M25.511 719.41     G89.29 338.29      PLAN:   After discussing treatment options, patient's right thumb A1 pulley was injected with 1/2 cc Marcaine and 1/2 cc Celestone. He will be scheduled for a right trigger thumb release. Risks of surgery outlined and informed consent obtained. He will follow up with his PCP for hypertension. He will follow up for H&P.       Scribed by Lyle Greenfield [facility]) as dictated by Kwasi Kinsey MD

## 2018-08-08 NOTE — PATIENT INSTRUCTIONS
Trigger Finger: Care Instructions  Your Care Instructions  A trigger finger is a finger stuck in a bent position. The bent finger usually straightens out on its own. A trigger finger can be painful, but it normally is not a serious problem. Trigger fingers seem to occur more in some groups of people. These include people who have diabetes or arthritis or who have injured their hands in the past. This problem also occurs in musicians and people who  tools often. Rest, exercises, and other things you can do at home may help your trigger finger relax so that it can bend as it should. You may get a corticosteroid shot. This can reduce swelling and pain. Your doctor may put a splint on your finger. This will give your finger some rest and avoid irritating the joint. You may need surgery if the finger keeps locking in a bent position. Follow-up care is a key part of your treatment and safety. Be sure to make and go to all appointments, and call your doctor if you are having problems. It's also a good idea to know your test results and keep a list of the medicines you take. How can you care for yourself at home? · If your doctor put a splint on your finger, wear the splint as directed. Do not remove it until your doctor says you can. · You may need to change your activities to avoid movements that irritate the finger. · If your finger is swollen, put ice or a cold pack on your finger for 10 to 20 minutes at a time. Try to do this every 1 to 2 hours for the next 3 days (when you are awake) or until the swelling goes down. Put a thin cloth between the ice and your skin. · Prop up your hand on a pillow when you ice it or anytime you sit or lie down during the next 3 days. Try to keep it above the level of your heart. This will help reduce swelling. · Take your medicines exactly as prescribed. Call your doctor if you think you are having a problem with your medicine.   · Ask your doctor if you can take an over-the-counter pain medicine, such as acetaminophen (Tylenol), ibuprofen (Advil, Motrin), or naproxen (Aleve). Be safe with medicines. Read and follow all instructions on the label. · If your doctor recommends exercises, do them as directed. When should you call for help? Call your doctor now or seek immediate medical care if:    · Your finger locks in a bent position and will not straighten.    Watch closely for changes in your health, and be sure to contact your doctor if:    · You do not get better as expected. Where can you learn more? Go to http://marita-jessica.info/. Enter M826 in the search box to learn more about \"Trigger Finger: Care Instructions. \"  Current as of: November 29, 2017  Content Version: 11.7  © 1064-7222 [employer]. Care instructions adapted under license by [employer] (which disclaims liability or warranty for this information). If you have questions about a medical condition or this instruction, always ask your healthcare professional. [address] any warranty or liability for your use of this information. Trigger Finger Release: Before Your Surgery  What is trigger finger release? Trigger finger release is surgery to make it easier to bend and straighten your finger. Your doctor will make a cut in the tissue over the tendon that helps bend your finger. This cut is called an incision. It will allow the tendon to move freely without pain. This surgery will probably be done while you are awake. The doctor will give you a shot (injection) to numb your hand and prevent pain. You also may get medicine to help you relax. The doctor will make an incision in the skin of your finger or palm. He or she will make a cut to open the tissue over the swollen part of the tendon. The doctor will close the skin incision with stitches. After surgery, you will have a small scar on your finger or palm.  This will fade with time.  It will probably take about 6 weeks for your hand to heal. After it heals, your finger may move easily without pain. You will go home the same day as the surgery. How soon you can return to work depends on your job. If you can do your job without using your hand, you may be able to go back in 1 or 2 days. But if your job requires you to do repeated finger or hand movements, put pressure on your hand, or lift things, you may need to take more time off work. Follow-up care is a key part of your treatment and safety. Be sure to make and go to all appointments, and call your doctor if you are having problems. It's also a good idea to know your test results and keep a list of the medicines you take. What happens before surgery?   Surgery can be stressful. This information will help you understand what you can expect. And it will help you safely prepare for surgery.   Preparing for surgery    · Understand exactly what surgery is planned, along with the risks, benefits, and other options. · Tell your doctors ALL the medicines, vitamins, supplements, and herbal remedies you take. Some of these can increase the risk of bleeding or interact with anesthesia.     · If you take blood thinners, such as warfarin (Coumadin), clopidogrel (Plavix), or aspirin, be sure to talk to your doctor. He or she will tell you if you should stop taking these medicines before your surgery. Make sure that you understand exactly what your doctor wants you to do.     · Your doctor will tell you which medicines to take or stop before your surgery. You may need to stop taking certain medicines a week or more before surgery. So talk to your doctor as soon as you can.     · If you have an advance directive, let your doctor know. It may include a living will and a durable power of  for health care. Bring a copy to the hospital. If you don't have one, you may want to prepare one.  It lets your doctor and loved ones know your health care wishes. Doctors advise that everyone prepare these papers before any type of surgery or procedure. What happens on the day of surgery? · Follow the instructions exactly about when to stop eating and drinking. If you don't, your surgery may be canceled. If your doctor told you to take your medicines on the day of surgery, take them with only a sip of water.     · Take a bath or shower before you come in for your surgery. Do not apply lotions, perfumes, deodorants, or nail polish.     · Do not shave the surgical site yourself.     · Take off all jewelry and piercings. And take out contact lenses, if you wear them.    At the hospital or surgery center   · Bring a picture ID.     · The area for surgery is often marked to make sure there are no errors.     · You will be kept comfortable and safe by your anesthesia provider. You may get medicine that relaxes you or puts you in a light sleep. The area being worked on will be numb.     · The surgery will take less than 1 hour. Going home   · Be sure you have someone to drive you home. Anesthesia and pain medicine make it unsafe for you to drive.     · You will be given more specific instructions about recovering from your surgery. They will cover things like diet, wound care, follow-up care, driving, and getting back to your normal routine. When should you call your doctor? · You have questions or concerns.     · You don't understand how to prepare for your surgery.     · You become ill before the surgery (such as fever, flu, or a cold).     · You need to reschedule or have changed your mind about having the surgery. Where can you learn more? Go to http://marita-jessica.info/. Enter F208 in the search box to learn more about \"Trigger Finger Release: Before Your Surgery. \"  Current as of: November 29, 2017  Content Version: 11.7  © 1360-5996 [employer].  Care instructions adapted under license by [employer] (which disclaims liability or warranty for this information). If you have questions about a medical condition or this instruction, always ask your healthcare professional. [address] any warranty or liability for your use of this information.

## 2018-08-09 ENCOUNTER — OFFICE VISIT (OUTPATIENT)
Dept: FAMILY MEDICINE CLINIC | Age: 53
End: 2018-08-09

## 2018-08-09 VITALS
WEIGHT: 253 LBS | TEMPERATURE: 98.2 F | HEART RATE: 72 BPM | OXYGEN SATURATION: 96 % | HEIGHT: 68 IN | DIASTOLIC BLOOD PRESSURE: 80 MMHG | BODY MASS INDEX: 38.34 KG/M2 | SYSTOLIC BLOOD PRESSURE: 148 MMHG | RESPIRATION RATE: 20 BRPM

## 2018-08-09 DIAGNOSIS — E55.9 VITAMIN D DEFICIENCY: ICD-10-CM

## 2018-08-09 DIAGNOSIS — E66.01 CLASS 2 SEVERE OBESITY DUE TO EXCESS CALORIES WITH SERIOUS COMORBIDITY AND BODY MASS INDEX (BMI) OF 38.0 TO 38.9 IN ADULT (HCC): ICD-10-CM

## 2018-08-09 DIAGNOSIS — E78.00 HIGH CHOLESTEROL: ICD-10-CM

## 2018-08-09 DIAGNOSIS — I10 ESSENTIAL HYPERTENSION: Primary | ICD-10-CM

## 2018-08-09 RX ORDER — ATENOLOL 50 MG/1
TABLET ORAL
Qty: 30 TAB | Refills: 5 | Status: SHIPPED | OUTPATIENT
Start: 2018-08-09 | End: 2019-01-24 | Stop reason: SDUPTHER

## 2018-08-09 NOTE — PROGRESS NOTES
Subjective:       Chief Complaint  The patient presents for follow up of hypertension and high cholesterol. Obesity, vit D deficiency         HPI  Paty Hanson is a 48 y.o. male seen for follow up of hyperlipidemia. Healso has hypertension. Hypertension poorly controlled, off Tenormin 50 mg. Patient unfortunately has regained the 40 pounds that he lost in the last 6 months, he has been stress dealing with his wife who is ill, hyperlipidemia poorly controlled with gaining weight back pt unable to tolerate statins. Patient's going to try again to work on cutting back calories increase in exercise to lose weight. His BMI now is up to 38    Diet and Lifestyle: generally follows a low fat low cholesterol diet, exercises regularly    Home BP Monitoring: is not measured at home. Other symptoms and concerns: Pt's vit D level is not well controlled on current supplementation. Patient to take vitamin D 2000 units per day on a regular basis      Current Outpatient Prescriptions   Medication Sig    atenolol (TENORMIN) 50 mg tablet TAKE ONE TABLET BY MOUTH DAILY    cholecalciferol, vitamin D3, (VITAMIN D3) 2,000 unit tab Take  by mouth.  multivitamin (ONE A DAY) tablet Take 1 Tab by mouth daily. No current facility-administered medications for this visit.               Review of Systems  Respiratory: negative for dyspnea on exertion  Cardiovascular: negative for chest pain    Objective:     Visit Vitals    /80 (BP 1 Location: Left arm, BP Patient Position: Sitting)    Pulse 72    Temp 98.2 °F (36.8 °C) (Oral)    Resp 20    Ht 5' 8\" (1.727 m)    Wt 253 lb (114.8 kg)    SpO2 96%    BMI 38.47 kg/m2        General appearance - alert, well appearing, and in no distress  Neck - supple, no significant adenopathy, carotids upstroke normal bilaterally, no bruits  Chest - clear to auscultation, no wheezes, rales or rhonchi, symmetric air entry  Heart - normal rate, regular rhythm, normal S1, S2, no murmurs, rubs, clicks or gallops  Extremities - peripheral pulses normal, no pedal edema, no clubbing or cyanosis  Skin - normal coloration and turgor, no rashes, no suspicious skin lesions noted      Labs:   Lab Results   Component Value Date/Time    Cholesterol, total 294 (H) 08/09/2018 09:51 AM    HDL Cholesterol 38 (L) 08/09/2018 09:51 AM    LDL,Direct 184 (H) 08/09/2018 09:51 AM    LDL, calculated  08/09/2018 09:51 AM      Comment:      Triglyceride >400. LDL cannot be calculated. LDL Cholesterol Direct has been  ordered. Triglyceride 403 (H) 08/09/2018 09:51 AM     Lab Results   Component Value Date/Time    ALT (SGPT) 38 08/09/2018 09:51 AM    AST (SGOT) 31 08/09/2018 09:51 AM    Alk. phosphatase 66 08/09/2018 09:51 AM    Bilirubin, direct <0.2 08/09/2018 09:51 AM    Bilirubin, total 0.4 08/09/2018 09:51 AM     Lab Results   Component Value Date/Time    GFR est  06/29/2015 07:51 AM    GFR est non- 06/29/2015 07:51 AM    Creatinine 0.7 08/01/2017 01:54 AM    BUN 22 08/01/2017 01:54 AM    Sodium 140 08/01/2017 01:54 AM    Potassium 4.2 08/01/2017 01:54 AM    Chloride 101 08/01/2017 01:54 AM    CO2 24 08/01/2017 01:54 AM            Assessment / Plan     Hypertension poorly controlled will restart Tenormin 50 mg daily  Hyperlipidemia poorly controlled with weight gain. Patient will try again to lose weight to control his cholesterol since he cannot tolerate statins. ICD-10-CM ICD-9-CM    1. Essential hypertension I10 401.9    2. High cholesterol E78.00 272.0 LIPID PANEL      HEPATIC FUNCTION PANEL      LIPID PANEL      HEPATIC FUNCTION PANEL   3. Class 2 severe obesity due to excess calories with serious comorbidity and body mass index (BMI) of 38.0 to 38.9 in adult [facility]) E66.01 278.01  patient will continue to work on trying to lose weight again. He may need to consider going back to the weight loss program that he was going to on a regular basis since the accountability helped him significantly. Z68.38 V85.38    4. Vitamin D deficiency E55.9 268.9  uncontrolled. Patient to take vitamin D 2000 units per day on a regular basis VITAMIN D, 25 HYDROXY      VITAMIN D, 25 HYDROXY             Low cholesterol diet, weight control and daily exercise discussed. Follow-up Disposition:  Return if symptoms worsen or fail to improve. Reviewed plan of care. Patient has provided input and agrees with goals.

## 2018-08-09 NOTE — PATIENT INSTRUCTIONS
High Blood Pressure: Care Instructions  Your Care Instructions    If your blood pressure is usually above 130/80, you have high blood pressure, or hypertension. That means the top number is 130 or higher or the bottom number is 80 or higher, or both. Despite what a lot of people think, high blood pressure usually doesn't cause headaches or make you feel dizzy or lightheaded. It usually has no symptoms. But it does increase your risk for heart attack, stroke, and kidney or eye damage. The higher your blood pressure, the more your risk increases. Your doctor will give you a goal for your blood pressure. Your goal will be based on your health and your age. Lifestyle changes, such as eating healthy and being active, are always important to help lower blood pressure. You might also take medicine to reach your blood pressure goal.  Follow-up care is a key part of your treatment and safety. Be sure to make and go to all appointments, and call your doctor if you are having problems. It's also a good idea to know your test results and keep a list of the medicines you take. How can you care for yourself at home? Medical treatment  · If you stop taking your medicine, your blood pressure will go back up. You may take one or more types of medicine to lower your blood pressure. Be safe with medicines. Take your medicine exactly as prescribed. Call your doctor if you think you are having a problem with your medicine. · Talk to your doctor before you start taking aspirin every day. Aspirin can help certain people lower their risk of a heart attack or stroke. But taking aspirin isn't right for everyone, because it can cause serious bleeding. · See your doctor regularly. You may need to see the doctor more often at first or until your blood pressure comes down. · If you are taking blood pressure medicine, talk to your doctor before you take decongestants or anti-inflammatory medicine, such as ibuprofen.  Some of these medicines can raise blood pressure. · Learn how to check your blood pressure at home. Lifestyle changes  · Stay at a healthy weight. This is especially important if you put on weight around the waist. Losing even 10 pounds can help you lower your blood pressure. · If your doctor recommends it, get more exercise. Walking is a good choice. Bit by bit, increase the amount you walk every day. Try for at least 30 minutes on most days of the week. You also may want to swim, bike, or do other activities. · Avoid or limit alcohol. Talk to your doctor about whether you can drink any alcohol. · Try to limit how much sodium you eat to less than 2,300 milligrams (mg) a day. Your doctor may ask you to try to eat less than 1,500 mg a day. · Eat plenty of fruits (such as bananas and oranges), vegetables, legumes, whole grains, and low-fat dairy products. · Lower the amount of saturated fat in your diet. Saturated fat is found in animal products such as milk, cheese, and meat. Limiting these foods may help you lose weight and also lower your risk for heart disease. · Do not smoke. Smoking increases your risk for heart attack and stroke. If you need help quitting, talk to your doctor about stop-smoking programs and medicines. These can increase your chances of quitting for good. When should you call for help? Call 911 anytime you think you may need emergency care. This may mean having symptoms that suggest that your blood pressure is causing a serious heart or blood vessel problem. Your blood pressure may be over 180/110.   For example, call 911 if:    · You have symptoms of a heart attack. These may include:  ¨ Chest pain or pressure, or a strange feeling in the chest.  ¨ Sweating. ¨ Shortness of breath. ¨ Nausea or vomiting. ¨ Pain, pressure, or a strange feeling in the back, neck, jaw, or upper belly or in one or both shoulders or arms. ¨ Lightheadedness or sudden weakness.   ¨ A fast or irregular heartbeat.     · You have symptoms of a stroke. These may include:  ¨ Sudden numbness, tingling, weakness, or loss of movement in your face, arm, or leg, especially on only one side of your body. ¨ Sudden vision changes. ¨ Sudden trouble speaking. ¨ Sudden confusion or trouble understanding simple statements. ¨ Sudden problems with walking or balance. ¨ A sudden, severe headache that is different from past headaches.     · You have severe back or belly pain.    Do not wait until your blood pressure comes down on its own. Get help right away.   Call your doctor now or seek immediate care if:    · Your blood pressure is much higher than normal (such as 180/110 or higher), but you don't have symptoms.     · You think high blood pressure is causing symptoms, such as:  ¨ Severe headache. ¨ Blurry vision.    Watch closely for changes in your health, and be sure to contact your doctor if:    · Your blood pressure measures 140/90 or higher at least 2 times. That means the top number is 140 or higher or the bottom number is 90 or higher, or both.     · You think you may be having side effects from your blood pressure medicine.     · Your blood pressure is usually normal, but it goes above normal at least 2 times. Where can you learn more? Go to http://marita-jessica.info/. Enter Y394 in the search box to learn more about \"High Blood Pressure: Care Instructions. \"  Current as of: December 6, 2017  Content Version: 11.7  © 1289-6192 [employer]. Care instructions adapted under license by Markado (which disclaims liability or warranty for this information). If you have questions about a medical condition or this instruction, always ask your healthcare professional. [place] [address] any warranty or liability for your use of this information.        Advance Directives: Care Instructions  Your Care Instructions  An advance directive is a legal way to state your wishes at the end of your life. It tells your family and your doctor what to do if you can no longer say what you want. There are two main types of advance directives. You can change them any time that your wishes change. · A living will tells your family and your doctor your wishes about life support and other treatment. · A durable power of  for health care lets you name a person to make treatment decisions for you when you can't speak for yourself. This person is called a health care agent. If you do not have an advance directive, decisions about your medical care may be made by a doctor or a  who doesn't know you. It may help to think of an advance directive as a gift to the people who care for you. If you have one, they won't have to make tough decisions by themselves. Follow-up care is a key part of your treatment and safety. Be sure to make and go to all appointments, and call your doctor if you are having problems. It's also a good idea to know your test results and keep a list of the medicines you take. How can you care for yourself at home? · Discuss your wishes with your loved ones and your doctor. This way, there are no surprises. · Many states have a unique form. Or you might use a universal form that has been approved by many states. This kind of form can sometimes be completed and stored online. Your electronic copy will then be available wherever you have a connection to the Internet. In most cases, doctors will respect your wishes even if you have a form from a different state. · You don't need a  to do an advance directive. But you may want to get legal advice. · Think about these questions when you prepare an advance directive:  ¨ Who do you want to make decisions about your medical care if you are not able to? Many people choose a family member or close friend. ¨ Do you know enough about life support methods that might be used?  If not, talk to your doctor so you understand. ¨ What are you most afraid of that might happen? You might be afraid of having pain, losing your independence, or being kept alive by machines. ¨ Where would you prefer to die? Choices include your home, a hospital, or a nursing home. ¨ Would you like to have information about hospice care to support you and your family? ¨ Do you want to donate organs when you die? ¨ Do you want certain Judaism practices performed before you die? If so, put your wishes in the advance directive. · Read your advance directive every year, and make changes as needed. When should you call for help? Be sure to contact your doctor if you have any questions. Where can you learn more? Go to http://marita-jessica.info/. Enter R264 in the search box to learn more about \"Advance Directives: Care Instructions. \"  Current as of: October 6, 2017  Content Version: 11.7  © 8650-6618 [employer]. Care instructions adapted under license by Weizoom (which disclaims liability or warranty for this information). If you have questions about a medical condition or this instruction, always ask your healthcare professional. [place] [address] any warranty or liability for your use of this information.

## 2018-08-09 NOTE — MR AVS SNAPSHOT
[address] [facility] 
 
 
 [address], [place] [address] [address] [address] 
[phone] Patient: Doc Aníbal MRN: [id number] :1965 Visit Information Date & Time Provider Department Dept. Phone Encounter #  
 2018  9:15 AM Zachery Balbuena [address] [place] [address] [id number] Follow-up Instructions Return if symptoms worsen or fail to improve. Your Appointments 2018  2:00 PM  
Office Visit with Zachery Balbuena MD  
[address] [place] [address] [address] [place] [address]) Appt Note: pre-op trigger thumb release [address] [facility] [address] [address]  
[phone]  
  
   
 [address]  [facility]  
  
    
 2018  9:00 AM  
HISTORY AND PHYSICAL with Sang Ibarra PA-C  
[facility] - [place] [address] [address]) Appt Note: HV OP SX 8-15-18 RT THUMB TRIGGER FINGER RELEASE/DMM  
 [address] [facility], [address] [place] [address]  
[phone]  
  
   
 [address] [facility], [address] [place] [id number] 2018  9:15 AM  
POST OP with Sang Ibarra PA-C  
[facility] - [facility] ([address]) Appt Note: S/P HV OP SX 8-15-18 RT THUMB TFR/DMM  
 [address] [facility], [address] [place] [address]  
[phone] [address] [place] [address], [address] Upcoming Health Maintenance Date Due Hepatitis C Screening 1965 COLONOSCOPY 1983 Influenza Age 5 to Adult 2018 FOBT Q 1 YEAR, 18+ 10/30/2018 DTaP/Tdap/Td series (2 - Td) 2022 Allergies as of 2018  Review Complete On: 2018 By: Zachery Balbuena MD  
  
 Severity Noted Reaction Type Reactions Statins-hmg-coa Reductase Inhibitors  2011    Myalgia Tricor [Fenofibrate Micronized]  2011    Myalgia Current Immunizations  Reviewed on 2012 Name Date PPD 2012 TDAP Vaccine 2012 Not reviewed this visit You Were Diagnosed With   
  
 Codes Comments Essential hypertension    -  Primary ICD-10-CM: I10 
ICD-9-CM: 401.9 High cholesterol     ICD-10-CM: E78.00 ICD-9-CM: 272.0 Class 2 severe obesity due to excess calories with serious comorbidity and body mass index (BMI) of 38.0 to 38.9 in adult [facility])     ICD-10-CM: E66.01, Z68.38 
ICD-9-CM: 278.01, V85.38 Vitamin D deficiency     ICD-10-CM: E55.9 ICD-9-CM: 268.9 Vitals BP Pulse Temp Resp Height(growth percentile) Weight(growth percentile) 148/80 (BP 1 Location: Left arm, BP Patient Position: Sitting) 72 98.2 °F (36.8 °C) (Oral) 20 5' 8\" (1.727 m) 253 lb (114.8 kg) SpO2 BMI Smoking Status 96% 38.47 kg/m2 Never Smoker BMI and BSA Data Body Mass Index Body Surface Area  
 38.47 kg/m 2 2.35 m 2 Preferred Pharmacy Pharmacy Name Phone [id number], [address] [place] [address] Your Updated Medication List  
  
   
This list is accurate as of 8/9/18  9:38 AM.  Always use your most recent med list.  
  
  
  
  
 atenolol 50 mg tablet Commonly known as:  TENORMIN  
TAKE ONE TABLET BY MOUTH DAILY  
  
 multivitamin tablet Commonly known as:  ONE A DAY Take 1 Tab by mouth daily. VITAMIN D3 2,000 unit Tab Generic drug:  cholecalciferol (vitamin D3) Take  by mouth. Prescriptions Sent to Pharmacy Refills  
 atenolol (TENORMIN) 50 mg tablet 5 Sig: TAKE ONE TABLET BY MOUTH DAILY Class: Normal  
 Pharmacy: [id number], [address] [place] [address],[place] [address] Ph #: [phone] Follow-up Instructions Return if symptoms worsen or fail to improve. To-Do List   
 08/09/2018 Lab:  HEPATIC FUNCTION PANEL   
  
 02/06/2019 Lab:  LIPID PANEL   
  
 02/08/2019 Lab:  VITAMIN D, 25 HYDROXY Patient Instructions High Blood Pressure: Care Instructions Your Care Instructions If your blood pressure is usually above 130/80, you have high blood pressure, or hypertension. That means the top number is 130 or higher or the bottom number is 80 or higher, or both. Despite what a lot of people think, high blood pressure usually doesn't cause headaches or make you feel dizzy or lightheaded. It usually has no symptoms. But it does increase your risk for heart attack, stroke, and kidney or eye damage. The higher your blood pressure, the more your risk increases. Your doctor will give you a goal for your blood pressure. Your goal will be based on your health and your age. Lifestyle changes, such as eating healthy and being active, are always important to help lower blood pressure. You might also take medicine to reach your blood pressure goal. 
Follow-up care is a key part of your treatment and safety. Be sure to make and go to all appointments, and call your doctor if you are having problems. It's also a good idea to know your test results and keep a list of the medicines you take. How can you care for yourself at home? Medical treatment · If you stop taking your medicine, your blood pressure will go back up. You may take one or more types of medicine to lower your blood pressure. Be safe with medicines. Take your medicine exactly as prescribed. Call your doctor if you think you are having a problem with your medicine. · Talk to your doctor before you start taking aspirin every day. Aspirin can help certain people lower their risk of a heart attack or stroke. But taking aspirin isn't right for everyone, because it can cause serious bleeding. · See your doctor regularly. You may need to see the doctor more often at first or until your blood pressure comes down. · If you are taking blood pressure medicine, talk to your doctor before you take decongestants or anti-inflammatory medicine, such as ibuprofen. Some of these medicines can raise blood pressure. · Learn how to check your blood pressure at home. Lifestyle changes · Stay at a healthy weight. This is especially important if you put on weight around the waist. Losing even 10 pounds can help you lower your blood pressure. · If your doctor recommends it, get more exercise. Walking is a good choice. Bit by bit, increase the amount you walk every day. Try for at least 30 minutes on most days of the week. You also may want to swim, bike, or do other activities. · Avoid or limit alcohol. Talk to your doctor about whether you can drink any alcohol. · Try to limit how much sodium you eat to less than 2,300 milligrams (mg) a day. Your doctor may ask you to try to eat less than 1,500 mg a day. · Eat plenty of fruits (such as bananas and oranges), vegetables, legumes, whole grains, and low-fat dairy products. · Lower the amount of saturated fat in your diet. Saturated fat is found in animal products such as milk, cheese, and meat. Limiting these foods may help you lose weight and also lower your risk for heart disease. · Do not smoke. Smoking increases your risk for heart attack and stroke. If you need help quitting, talk to your doctor about stop-smoking programs and medicines. These can increase your chances of quitting for good. When should you call for help? Call 911 anytime you think you may need emergency care. This may mean having symptoms that suggest that your blood pressure is causing a serious heart or blood vessel problem. Your blood pressure may be over 180/110. 
 For example, call 911 if: 
  · You have symptoms of a heart attack. These may include: ¨ Chest pain or pressure, or a strange feeling in the chest. 
¨ Sweating. ¨ Shortness of breath. ¨ Nausea or vomiting. ¨ Pain, pressure, or a strange feeling in the back, neck, jaw, or upper belly or in one or both shoulders or arms. ¨ Lightheadedness or sudden weakness. ¨ A fast or irregular heartbeat.   · You have symptoms of a stroke. These may include: 
¨ Sudden numbness, tingling, weakness, or loss of movement in your face, arm, or leg, especially on only one side of your body. ¨ Sudden vision changes. ¨ Sudden trouble speaking. ¨ Sudden confusion or trouble understanding simple statements. ¨ Sudden problems with walking or balance. ¨ A sudden, severe headache that is different from past headaches.  
  · You have severe back or belly pain.  
 Do not wait until your blood pressure comes down on its own. Get help right away. 
 Call your doctor now or seek immediate care if: 
  · Your blood pressure is much higher than normal (such as 180/110 or higher), but you don't have symptoms.  
  · You think high blood pressure is causing symptoms, such as: ¨ Severe headache. ¨ Blurry vision.  
 Watch closely for changes in your health, and be sure to contact your doctor if: 
  · Your blood pressure measures 140/90 or higher at least 2 times. That means the top number is 140 or higher or the bottom number is 90 or higher, or both.  
  · You think you may be having side effects from your blood pressure medicine.  
  · Your blood pressure is usually normal, but it goes above normal at least 2 times. Where can you learn more? Go to http://marita-jessica.info/. Enter H583 in the search box to learn more about \"High Blood Pressure: Care Instructions. \" Current as of: December 6, 2017 Content Version: 11.7 © 4398-4233 [employer]eknovus. Care instructions adapted under license by [employer] T[employer] (which disclaims liability or warranty for this information). If you have questions about a medical condition or this instruction, always ask your healthcare professional. [place] [address] any warranty or liability for your use of this information. Advance Directives: Care Instructions Your Care Instructions An advance directive is a legal way to state your wishes at the end of your life. It tells your family and your doctor what to do if you can no longer say what you want. There are two main types of advance directives. You can change them any time that your wishes change. · A living will tells your family and your doctor your wishes about life support and other treatment. · A durable power of  for health care lets you name a person to make treatment decisions for you when you can't speak for yourself. This person is called a health care agent. If you do not have an advance directive, decisions about your medical care may be made by a doctor or a  who doesn't know you. It may help to think of an advance directive as a gift to the people who care for you. If you have one, they won't have to make tough decisions by themselves. Follow-up care is a key part of your treatment and safety. Be sure to make and go to all appointments, and call your doctor if you are having problems. It's also a good idea to know your test results and keep a list of the medicines you take. How can you care for yourself at home? · Discuss your wishes with your loved ones and your doctor. This way, there are no surprises. · Many states have a unique form. Or you might use a universal form that has been approved by many states. This kind of form can sometimes be completed and stored online. Your electronic copy will then be available wherever you have a connection to the Internet. In most cases, doctors will respect your wishes even if you have a form from a different state. · You don't need a  to do an advance directive. But you may want to get legal advice. · Think about these questions when you prepare an advance directive: ¨ Who do you want to make decisions about your medical care if you are not able to? Many people choose a family member or close friend. ¨ Do you know enough about life support methods that might be used? If not, talk to your doctor so you understand. ¨ What are you most afraid of that might happen? You might be afraid of having pain, losing your independence, or being kept alive by machines. ¨ Where would you prefer to die? Choices include your home, a hospital, or a nursing home. ¨ Would you like to have information about hospice care to support you and your family? ¨ Do you want to donate organs when you die? ¨ Do you want certain Jew practices performed before you die? If so, put your wishes in the advance directive. · Read your advance directive every year, and make changes as needed. When should you call for help? Be sure to contact your doctor if you have any questions. Where can you learn more? Go to http://marita-jessica.info/. Enter R264 in the search box to learn more about \"Advance Directives: Care Instructions. \" Current as of: October 6, 2017 Content Version: 11.7 © 2582-8232 Thinkfuse. Care instructions adapted under license by HotPads (which disclaims liability or warranty for this information). If you have questions about a medical condition or this instruction, always ask your healthcare professional. [place] [address] any warranty or liability for your use of this information. Introducing [facility]! Dear Cecilia Vargas: 
Thank you for requesting a Ium account. Our records indicate that you already have an active Ium account. You can access your account anytime at [email or web address] Did you know that you can access your hospital and ER discharge instructions at any time in Ium? You can also review all of your test results from your hospital stay or ER visit. Additional Information If you have questions, please visit the Frequently Asked Questions section of the Sleep Number website at [email or web address] The Box [email or web address]. [email or web address] Remember, Sleep Number is NOT to be used for urgent needs. For medical emergencies, dial 911. Now available from your iPhone and Android! Please provide this summary of care documentation to your next provider. Your primary care clinician is listed as CAROLEE BECERRA. If you have any questions after today's visit, please call [phone].

## 2018-08-09 NOTE — PROGRESS NOTES
Patient is in the office today for HTN. 1. Have you been to the ER, urgent care clinic since your last visit? Hospitalized since your last visit? No    2. Have you seen or consulted any other health care providers outside of the [address] [place] [address] since your last visit? Include any pap smears or colon screening.  No

## 2018-08-10 LAB
25(OH)D3 SERPL-MCNC: 20.8 NG/ML (ref 32–100)
A-G RATIO,AGRAT: 2 RATIO (ref 1.1–2.6)
ALBUMIN SERPL-MCNC: 4.7 G/DL (ref 3.5–5)
ALP SERPL-CCNC: 66 U/L (ref 25–115)
ALT SERPL-CCNC: 38 U/L (ref 5–40)
AST SERPL W P-5'-P-CCNC: 31 U/L (ref 10–37)
BILIRUB SERPL-MCNC: 0.4 MG/DL (ref 0.2–1.2)
BILIRUBIN, DIRECT,CBIL: <0.2 MG/DL (ref 0–0.3)
CHOLEST SERPL-MCNC: 294 MG/DL (ref 110–200)
GLOBULIN,GLOB: 2.4 G/DL (ref 2–4)
HDLC SERPL-MCNC: 38 MG/DL (ref 40–59)
HDLC SERPL-MCNC: 7.7 MG/DL (ref 0–5)
LDL, DIRECT,DLDL: 184 MG/DL (ref 50–99)
LDLC SERPL CALC-MCNC: ABNORMAL MG/DL (ref 50–99)
PROT SERPL-MCNC: 7.1 G/DL (ref 6.4–8.3)
TRIGL SERPL-MCNC: 403 MG/DL (ref 40–149)
VLDLC SERPL CALC-MCNC: 81 MG/DL (ref 8–30)

## 2018-08-13 ENCOUNTER — OFFICE VISIT (OUTPATIENT)
Dept: FAMILY MEDICINE CLINIC | Age: 53
End: 2018-08-13

## 2018-08-13 VITALS
WEIGHT: 255.4 LBS | DIASTOLIC BLOOD PRESSURE: 69 MMHG | OXYGEN SATURATION: 95 % | HEART RATE: 65 BPM | BODY MASS INDEX: 38.71 KG/M2 | RESPIRATION RATE: 16 BRPM | TEMPERATURE: 97.9 F | HEIGHT: 68 IN | SYSTOLIC BLOOD PRESSURE: 121 MMHG

## 2018-08-13 DIAGNOSIS — M65.311 TRIGGER FINGER OF RIGHT THUMB: ICD-10-CM

## 2018-08-13 DIAGNOSIS — E66.01 CLASS 2 SEVERE OBESITY DUE TO EXCESS CALORIES WITH SERIOUS COMORBIDITY AND BODY MASS INDEX (BMI) OF 38.0 TO 38.9 IN ADULT (HCC): ICD-10-CM

## 2018-08-13 DIAGNOSIS — I10 ESSENTIAL HYPERTENSION: Primary | ICD-10-CM

## 2018-08-13 DIAGNOSIS — E78.5 DYSLIPIDEMIA: ICD-10-CM

## 2018-08-13 NOTE — MR AVS SNAPSHOT
[address] [facility] 
 
 
 [address] [address] [address] [address] 
[phone] Patient: Mauricio Delgado MRN: [id number] :1965 Visit Information Date & Time Provider Department Dept. Phone Encounter #  
 2018  2:00 PM Shahzad Israel, [address] [place] [address] [phone] [id number] Follow-up Instructions Return in about 6 months (around 2019), or if symptoms worsen or fail to improve, for labs 1 week before. Your Appointments 2018  9:00 AM  
HISTORY AND PHYSICAL with Dawna Franco PA-C  
[facility] [address] [place] [address] Road) Appt Note: HV OP SX 8-15-18 RT THUMB TRIGGER FINGER RELEASE/DMM  
 [address] [facility], [address] [id number]  
[phone]  
  
   
 [address] [facility], [address] [place] [address] 2018  9:15 AM  
POST OP with Dawna Franco PA-C  
[facility] ([address]) Appt Note: S/P HV OP SX 15-18 RT THUMB TFR/DMM  
 [address] [facility], [address] [place] [address]  
[phone] [address] [place] [address], [facility] [address] Upcoming Health Maintenance Date Due Hepatitis C Screening 1965 COLONOSCOPY 1983 Influenza Age 5 to Adult 2018 FOBT Q 1 YEAR, 18+ 10/30/2018 DTaP/Tdap/Td series (2 - Td) 2022 Allergies as of 2018  Review Complete On: 2018 By: Shahzad Israel MD  
  
 Severity Noted Reaction Type Reactions Statins-hmg-coa Reductase Inhibitors  2011    Myalgia Tricor [Fenofibrate Micronized]  2011    Myalgia Current Immunizations  Reviewed on 2012 Name Date PPD 2012 TDAP Vaccine 2012 Not reviewed this visit You Were Diagnosed With   
  
 Codes Comments Essential hypertension    -  Primary ICD-10-CM: I10 
ICD-9-CM: 401.9 Dyslipidemia     ICD-10-CM: E78.5 ICD-9-CM: 272.4 Trigger finger of right thumb     ICD-10-CM: A76.209 ICD-9-CM: 727.03 Vitals BP Pulse Temp Resp Height(growth percentile) Weight(growth percentile) 121/69 (BP 1 Location: Right arm, BP Patient Position: Sitting) 65 97.9 °F (36.6 °C) (Oral) 16 5' 8\" (1.727 m) 255 lb 6.4 oz (115.8 kg) SpO2 BMI Smoking Status 95% 38.83 kg/m2 Never Smoker BMI and BSA Data Body Mass Index Body Surface Area  
 38.83 kg/m 2 2.36 m 2 Preferred Pharmacy Pharmacy Name Phone [id number], [address] [place] [address] Your Updated Medication List  
  
   
This list is accurate as of 8/13/18  2:27 PM.  Always use your most recent med list.  
  
  
  
  
 atenolol 50 mg tablet Commonly known as:  TENORMIN  
TAKE ONE TABLET BY MOUTH DAILY  
  
 multivitamin tablet Commonly known as:  ONE A DAY Take 1 Tab by mouth daily. VITAMIN D3 2,000 unit Tab Generic drug:  cholecalciferol (vitamin D3) Take  by mouth. Follow-up Instructions Return in about 6 months (around 2/13/2019), or if symptoms worsen or fail to improve, for labs 1 week before. Patient Instructions A Healthy Lifestyle: Care Instructions Your Care Instructions A healthy lifestyle can help you feel good, stay at a healthy weight, and have plenty of energy for both work and play. A healthy lifestyle is something you can share with your whole family. A healthy lifestyle also can lower your risk for serious health problems, such as high blood pressure, heart disease, and diabetes. You can follow a few steps listed below to improve your health and the health of your family. Follow-up care is a key part of your treatment and safety. Be sure to make and go to all appointments, and call your doctor if you are having problems. It's also a good idea to know your test results and keep a list of the medicines you take. How can you care for yourself at home? · Do not eat too much sugar, fat, or fast foods. You can still have dessert and treats now and then. The goal is moderation. · Start small to improve your eating habits. Pay attention to portion sizes, drink less juice and soda pop, and eat more fruits and vegetables. ¨ Eat a healthy amount of food. A 3-ounce serving of meat, for example, is about the size of a deck of cards. Fill the rest of your plate with vegetables and whole grains. ¨ Limit the amount of soda and sports drinks you have every day. Drink more water when you are thirsty. ¨ Eat at least 5 servings of fruits and vegetables every day. It may seem like a lot, but it is not hard to reach this goal. A serving or helping is 1 piece of fruit, 1 cup of vegetables, or 2 cups of leafy, raw vegetables. Have an apple or some carrot sticks as an afternoon snack instead of a candy bar. Try to have fruits and/or vegetables at every meal. 
· Make exercise part of your daily routine. You may want to start with simple activities, such as walking, bicycling, or slow swimming. Try to be active 30 to 60 minutes every day. You do not need to do all 30 to 60 minutes all at once. For example, you can exercise 3 times a day for 10 or 20 minutes. Moderate exercise is safe for most people, but it is always a good idea to talk to your doctor before starting an exercise program. 
· Keep moving. [place] the lawn, work in the garden, or [employer]. Take the stairs instead of the elevator at work. · If you smoke, quit. People who smoke have an increased risk for heart attack, stroke, cancer, and other lung illnesses. Quitting is hard, but there are ways to boost your chance of quitting tobacco for good. ¨ Use nicotine gum, patches, or lozenges. ¨ Ask your doctor about stop-smoking programs and medicines. ¨ Keep trying.  
In addition to reducing your risk of diseases in the future, you will notice some benefits soon after you stop using tobacco. If you have shortness of breath or asthma symptoms, they will likely get better within a few weeks after you quit. · Limit how much alcohol you drink. Moderate amounts of alcohol (up to 2 drinks a day for men, 1 drink a day for women) are okay. But drinking too much can lead to liver problems, high blood pressure, and other health problems. Family health If you have a family, there are many things you can do together to improve your health. · Eat meals together as a family as often as possible. · Eat healthy foods. This includes fruits, vegetables, lean meats and dairy, and whole grains. · Include your family in your fitness plan. Most people think of activities such as jogging or tennis as the way to fitness, but there are many ways you and your family can be more active. Anything that makes you breathe hard and gets your heart pumping is exercise. Here are some tips: 
¨ Walk to do errands or to take your child to school or the bus. ¨ Go for a family bike ride after dinner instead of watching TV. Where can you learn more? Go to http://maritaStorageTreasures.comjessica.info/. Enter H794 in the search box to learn more about \"A Healthy Lifestyle: Care Instructions. \" Current as of: December 7, 2017 Content Version: 11.7 © 8210-7713 [employer]. Care instructions adapted under license by [employer] (which disclaims liability or warranty for this information). If you have questions about a medical condition or this instruction, always ask your healthcare professional. [address] [place] [address] any warranty or liability for your use of this information. Introducing hospitals & HEALTH SERVICES! Dear [employer]: 
Thank you for requesting a [employer] account. Our records indicate that you already have an active [employer] account. You can access your account anytime at [email or web address] Did you know that you can access your hospital and ER discharge instructions at any time in [employer]? You can also review all of your test results from your hospital stay or ER visit. Additional Information If you have questions, please visit the Frequently Asked Questions section of the [employer] website at [email or web address] Remember, [employer] is NOT to be used for urgent needs. For medical emergencies, dial 911. Now available from your iPhone and Android! Please provide this summary of care documentation to your next provider. Your primary care clinician is listed as CAROLEE BECERRA. If you have any questions after today's visit, please call [phone].

## 2018-08-13 NOTE — PROGRESS NOTES
Preoperative Evaluation    Date of Exam: 2018    Halle Mulligan is a 48 y.o. male (:1965) who presents for preoperative evaluation. Procedure/Surgery:Right trigger thumb release  Date of Procedure/Surgery: 8/15/18  Surgeon: Dr Viola Archibald: Michela Hagan     Primary Physician: Janet Arellano MD    HPI: Patient presents for medical clearance for release of his right trigger thumb. Patient has a history of hypertension that had improved with weight loss but since patient has regained his weight blood pressure has been elevated. It is now better on Tenormin 50 mg daily which patient has been taking for the last few week or so. Patient also has dyslipidemia with triglycerides at 403 and total cholesterol at 294 and HDL at 38. Patient is unable to take statins due to myalgias so he is controlling his cholesterol with diet and exercise and was doing well until recent weight gain. Otherwise  [facility] is in generally good health he continues to work on losing weight to decrease his BMI of 38. Patient is low risk for low risk surgery above and is medically cleared to proceed. Medical History:     Past Medical History:   Diagnosis Date    High cholesterol     Hypertension      Allergies: Allergies   Allergen Reactions    Statins-Hmg-Coa Reductase Inhibitors Myalgia    Tricor [Fenofibrate Micronized] Myalgia      Medications:     Current Outpatient Prescriptions   Medication Sig    atenolol (TENORMIN) 50 mg tablet TAKE ONE TABLET BY MOUTH DAILY    cholecalciferol, vitamin D3, (VITAMIN D3) 2,000 unit tab Take  by mouth.  multivitamin (ONE A DAY) tablet Take 1 Tab by mouth daily. No current facility-administered medications for this visit.       Surgical History:     Past Surgical History:   Procedure Laterality Date    HX CHOLECYSTECTOMY  01   [facility]rav[facility]en ORTHOPAEDIC  2015    CTS surgery Dr. Juan Fink     Social History:     Social History     Social History    Marital status:      Spouse name: N/A    Number of children: N/A    Years of education: N/A     Social History Main Topics    Smoking status: Never Smoker    Smokeless tobacco: Never Used    Alcohol use No    Drug use: No    Sexual activity: Yes     Partners: Female     Other Topics Concern    None     Social History Narrative       Recent use of: No recent use of aspirin (ASA), NSAIDS or steroids    Anesthesia Complications: None  History of abnormal bleeding : None      REVIEW OF SYSTEMS:  Respiratory: negative for dyspnea on exertion  Cardiovascular: negative for chest pain    EXAM:   Visit Vitals    /69 (BP 1 Location: Right arm, BP Patient Position: Sitting)    Pulse 65    Temp 97.9 °F (36.6 °C) (Oral)    Resp 16    Ht 5' 8\" (1.727 m)    Wt 255 lb 6.4 oz (115.8 kg)    SpO2 95%    BMI 38.83 kg/m2     Eyes - pupils equal and reactive, extraocular eye movements intact  Mouth - mucous membranes moist, pharynx normal without lesions  Chest - clear to auscultation, no wheezes, rales or rhonchi, symmetric air entry  Heart - normal rate, regular rhythm, normal S1, S2, no murmurs, rubs, clicks or gallops  Extremities - peripheral pulses normal, no pedal edema, no clubbing or cyanosis      DIAGNOSTICS:   1. EKG: EKG FINDINGS - normal EKG, normal sinus rhythm  2. CXR: was negative for infiltrate, effusion, pneumothorax, or wide mediastinum  3.  Labs:        Lab Results   Component Value Date/Time    Sodium 140 08/01/2017 01:54 AM    Potassium 4.2 08/01/2017 01:54 AM    Chloride 101 08/01/2017 01:54 AM    CO2 24 08/01/2017 01:54 AM    Anion gap 15.0 08/01/2017 01:54 AM    Glucose 73 08/01/2017 01:54 AM    BUN 22 08/01/2017 01:54 AM    Creatinine 0.7 08/01/2017 01:54 AM    BUN/Creatinine ratio 18 06/29/2015 07:51 AM    GFR est  06/29/2015 07:51 AM    GFR est non- 06/29/2015 07:51 AM    Calcium 9.4 08/01/2017 01:54 AM    Bilirubin, total 0.4 08/09/2018 09:51 AM    ALT (SGPT) 38 08/09/2018 09:51 AM    AST (SGOT) 31 08/09/2018 09:51 AM    Alk. phosphatase 66 08/09/2018 09:51 AM    Protein, total 7.1 08/09/2018 09:51 AM    Albumin 4.7 08/09/2018 09:51 AM    Globulin 2.4 08/09/2018 09:51 AM    A-G Ratio 2.0 08/09/2018 09:51 AM        IMPRESSION:         ICD-10-CM ICD-9-CM    1. Essential hypertension I10 401.9  well-controlled on Tenormin 50 mg daily. Patient to take tab a.m. of surgery with sip of water   2. Dyslipidemia E78.5 272.4  uncontrolled. Patient to continue to work on losing weight to improve further. 3. Class 2 severe obesity due to excess calories with serious comorbidity and body mass index (BMI) of 38.0 to 38.9 in adult [facility]) E66.01 278.01  patient will continue to work on diet and exercise to lose weight. Z68.38 V85.38    4.  Trigger finger of right thumb M65.311 727.03  patient medically cleared for surgery above     Milton Shultz MD   8/13/2018

## 2018-08-13 NOTE — PATIENT INSTRUCTIONS

## 2018-08-13 NOTE — PROGRESS NOTES
Harshad Schroeder is a 48 y.o. male presented in clinic today for   Chief Complaint   Patient presents with    Pre-op Exam   1. Have you been to the ER, urgent care clinic since your last visit? Hospitalized since your last visit? No    2. Have you seen or consulted any other health care providers outside of the [address] [place] [address] since your last visit? Include any pap smears or colon screening.  No   Pre-op  Dr. Clemencia Cifuentes  Surgery day Aug 13, 2018  Surgical Procedure; Right trigger thumb release  At [facility]

## 2018-08-14 ENCOUNTER — TELEPHONE (OUTPATIENT)
Dept: FAMILY MEDICINE CLINIC | Age: 53
End: 2018-08-14

## 2018-08-14 ENCOUNTER — OFFICE VISIT (OUTPATIENT)
Dept: ORTHOPEDIC SURGERY | Facility: CLINIC | Age: 53
End: 2018-08-14

## 2018-08-14 VITALS
HEIGHT: 68 IN | WEIGHT: 259 LBS | HEART RATE: 63 BPM | SYSTOLIC BLOOD PRESSURE: 140 MMHG | DIASTOLIC BLOOD PRESSURE: 81 MMHG | TEMPERATURE: 96.3 F | BODY MASS INDEX: 39.25 KG/M2 | OXYGEN SATURATION: 98 %

## 2018-08-14 DIAGNOSIS — G89.18 ACUTE POST-OPERATIVE PAIN: ICD-10-CM

## 2018-08-14 DIAGNOSIS — M65.311 TRIGGER FINGER OF RIGHT THUMB: Primary | ICD-10-CM

## 2018-08-14 DIAGNOSIS — Z01.818 PREOP GENERAL PHYSICAL EXAM: ICD-10-CM

## 2018-08-14 RX ORDER — ATENOLOL 50 MG/1
TABLET ORAL
Qty: 30 TAB | Refills: 5 | Status: CANCELLED | OUTPATIENT
Start: 2018-08-14

## 2018-08-14 RX ORDER — HYDROCODONE BITARTRATE AND ACETAMINOPHEN 7.5; 325 MG/1; MG/1
1 TABLET ORAL
Qty: 28 TAB | Refills: 0 | Status: SHIPPED | OUTPATIENT
Start: 2018-08-14 | End: 2018-12-26

## 2018-08-14 NOTE — H&P
History and Physical        53 year severe morbidly obese male seen in preparation for Right open trigger thumb release   Review of Systems   Constitutional: Positive for activity change (Decreased use right thumb secondary to triggering). Negative for fatigue and fever. HENT: Negative for ear pain. Eyes: Negative for pain. Respiratory: Positive for shortness of breath (heavy exertional). Cardiovascular: Negative. Endocrine: Negative. Genitourinary: Negative for flank pain. Musculoskeletal: Positive for joint swelling and myalgias. Skin: Negative. Allergic/Immunologic: Negative. Neurological: Positive for weakness (right hand). Hematological: Negative. Psychiatric/Behavioral: Negative. Physical Exam   Nursing note and vitals reviewed. Constitutional: He is oriented to person, place, and time. He appears well-developed and well-nourished. HENT:   Head: Normocephalic and atraumatic. Eyes: Conjunctivae and EOM are normal. Pupils are equal, round, and reactive to light. Neck: Normal range of motion. Cardiovascular: Normal rate, regular rhythm, normal heart sounds and intact distal pulses. Pulmonary/Chest: Effort normal and breath sounds normal.   Musculoskeletal:        Hands:  Neurological: He is alert and oriented to person, place, and time. Skin: Skin is warm and dry. Psychiatric: He has a normal mood and affect. His behavior is normal. Judgment and thought content normal.     Right thumb triggering    Right open trigger thumb release     Risk / Benefit: Surgeon will discuss in \"face to face\" encounter on day of surgery. Family History and Surgical History reviewed, discussed in detail, and deemed Non-Contributory in preparation for this surgical encounter. Risk / Benefit: Surgeon will discuss in \"face to face\" encounter on day of surgery.     Family History and Surgical History reviewed, discussed in detail, and deemed Non-Contributory in preparation for this surgical encounter.

## 2018-08-14 NOTE — PROGRESS NOTES
HISTORY:   Martelle Presume returns for history and physical in preparation for upcoming right thumb open trigger release. Please see attached forms in Epic for history and physical and review of systems.

## 2018-08-14 NOTE — TELEPHONE ENCOUNTER
Marvin Alejandro from Dr Leo Soliz office wanted provider to be aware that the patient will be having his surgery done at [facility] in [place] and not [facility].

## 2018-08-14 NOTE — TELEPHONE ENCOUNTER
Patients script for Tenormin was not received at his pharmacy. Pharmacy was called and they did not receive script. Please resend to pharmacy.

## 2018-08-16 ENCOUNTER — OFFICE VISIT (OUTPATIENT)
Dept: ORTHOPEDIC SURGERY | Age: 53
End: 2018-08-16

## 2018-08-16 DIAGNOSIS — M65.311 TRIGGER FINGER OF RIGHT THUMB: Primary | ICD-10-CM

## 2018-08-16 DIAGNOSIS — Z98.890 S/P TRIGGER FINGER RELEASE: ICD-10-CM

## 2018-08-16 NOTE — TELEPHONE ENCOUNTER
Patient script is not at pharmacy. Please call in and let patient know when it has been done. He can be reached at [phone]. If he needs to come  script please let his know.

## 2018-08-27 ENCOUNTER — OFFICE VISIT (OUTPATIENT)
Dept: ORTHOPEDIC SURGERY | Facility: CLINIC | Age: 53
End: 2018-08-27

## 2018-08-27 VITALS
HEART RATE: 70 BPM | SYSTOLIC BLOOD PRESSURE: 134 MMHG | TEMPERATURE: 96.7 F | HEIGHT: 68 IN | DIASTOLIC BLOOD PRESSURE: 71 MMHG | WEIGHT: 250 LBS | BODY MASS INDEX: 37.89 KG/M2 | OXYGEN SATURATION: 99 %

## 2018-08-27 DIAGNOSIS — Z98.890 S/P TRIGGER FINGER RELEASE: Primary | ICD-10-CM

## 2018-08-27 DIAGNOSIS — Z48.02 VISIT FOR SUTURE REMOVAL: ICD-10-CM

## 2018-08-27 NOTE — PROGRESS NOTES
HISTORY:  Veverly Alpers returns for postop check to his right thumb open trigger release. EXAMINATION:  The surgical site has matured nicely. Sutures are ready for extraction. PROCEDURE:  Using clean technique, all sutures removed today with no complications. Patient tolerated the procedure well. PLAN:  He may wash and bathe the hand normally, avoid any prolonged soaking for 24 hours. Will plan on seeing him back on a prn basis. All of his questions were answered to his satisfaction.

## 2018-12-26 ENCOUNTER — OFFICE VISIT (OUTPATIENT)
Dept: FAMILY MEDICINE CLINIC | Age: 53
End: 2018-12-26

## 2018-12-26 VITALS
HEIGHT: 68 IN | OXYGEN SATURATION: 98 % | SYSTOLIC BLOOD PRESSURE: 133 MMHG | WEIGHT: 261.6 LBS | BODY MASS INDEX: 39.65 KG/M2 | RESPIRATION RATE: 20 BRPM | DIASTOLIC BLOOD PRESSURE: 74 MMHG | TEMPERATURE: 98.7 F | HEART RATE: 63 BPM

## 2018-12-26 DIAGNOSIS — J01.10 ACUTE NON-RECURRENT FRONTAL SINUSITIS: Primary | ICD-10-CM

## 2018-12-26 DIAGNOSIS — R05.9 COUGH: ICD-10-CM

## 2018-12-26 RX ORDER — CODEINE PHOSPHATE AND GUAIFENESIN 10; 100 MG/5ML; MG/5ML
10 SOLUTION ORAL
Qty: 120 ML | Refills: 0 | Status: SHIPPED | OUTPATIENT
Start: 2018-12-26 | End: 2019-06-28 | Stop reason: ALTCHOICE

## 2018-12-26 RX ORDER — AZITHROMYCIN 250 MG/1
TABLET, FILM COATED ORAL
Qty: 6 TAB | Refills: 0 | Status: SHIPPED | OUTPATIENT
Start: 2018-12-26 | End: 2018-12-31

## 2018-12-26 RX ORDER — MONTELUKAST SODIUM 10 MG/1
10 TABLET ORAL DAILY
Qty: 30 TAB | Refills: 5 | Status: SHIPPED | OUTPATIENT
Start: 2018-12-26 | End: 2019-06-28 | Stop reason: SINTOL

## 2018-12-26 NOTE — PROGRESS NOTES
HISTORY OF PRESENT ILLNESS  Daniele Wilson is a 48 y.o. male. Cold Symptoms   The history is provided by the patient. This is a new problem. The problem occurs constantly. The problem has not changed since onset. The cough is productive of sputum. Associated symptoms include wheezing. Treatments tried: mucinex. The treatment provided mild relief. Review of Systems   HENT: Positive for congestion. Respiratory: Positive for cough, sputum production and wheezing. Physical Exam   Constitutional: He appears well-developed and well-nourished. HENT:   Right Ear: Tympanic membrane and ear canal normal.   Left Ear: Tympanic membrane and ear canal normal.   Nose: Mucosal edema present. Mouth/Throat: Uvula is midline, oropharynx is clear and moist and mucous membranes are normal.   Cardiovascular: Normal rate, regular rhythm and normal heart sounds. Pulmonary/Chest: Effort normal and breath sounds normal.   Vitals reviewed. ASSESSMENT and PLAN    ICD-10-CM ICD-9-CM    1. Acute non-recurrent frontal sinusitis J01.10 461.1 Will treat with azithromycin (ZITHROMAX) 250 mg tablet      And montelukast (SINGULAIR) 10 mg tablet   2. Cough R05 786.2 Treat with guaiFENesin-codeine (CHERATUSSIN AC) 100-10 mg/5 mL solution     Reviewed plan of care. Patient has provided input and agrees with goals.

## 2018-12-26 NOTE — PROGRESS NOTES
Patient here for Congestion. Patient reports popping of the ears, wheezing and cough. Sputum from cough is greenish in color. Symptoms has stated since Wednesdays. 1. Have you been to the ER, urgent care clinic since your last visit? Hospitalized since your last visit? No    2. Have you seen or consulted any other health care providers outside of the [address] [place] [address] since your last visit? Include any pap smears or colon screening.  No

## 2019-01-24 RX ORDER — ATENOLOL 50 MG/1
TABLET ORAL
Qty: 30 TAB | Refills: 5 | Status: SHIPPED | OUTPATIENT
Start: 2019-01-24 | End: 2019-08-08 | Stop reason: SDUPTHER

## 2019-06-18 ENCOUNTER — TELEPHONE (OUTPATIENT)
Dept: FAMILY MEDICINE CLINIC | Age: 54
End: 2019-06-18

## 2019-06-18 DIAGNOSIS — E55.9 VITAMIN D DEFICIENCY: ICD-10-CM

## 2019-06-18 DIAGNOSIS — I10 ESSENTIAL HYPERTENSION: Primary | ICD-10-CM

## 2019-06-18 DIAGNOSIS — E78.5 DYSLIPIDEMIA: ICD-10-CM

## 2019-06-18 NOTE — TELEPHONE ENCOUNTER
Patient called to get his lab orders entered for his appointment on Friday. Please advise [phone]. Patient needs to have labs done at the hospital. Please let him know when they are in the system so he can have them done.

## 2019-06-28 ENCOUNTER — OFFICE VISIT (OUTPATIENT)
Dept: FAMILY MEDICINE CLINIC | Age: 54
End: 2019-06-28

## 2019-06-28 VITALS
OXYGEN SATURATION: 98 % | HEART RATE: 68 BPM | RESPIRATION RATE: 20 BRPM | HEIGHT: 68 IN | SYSTOLIC BLOOD PRESSURE: 136 MMHG | WEIGHT: 278 LBS | BODY MASS INDEX: 42.13 KG/M2 | TEMPERATURE: 97.8 F | DIASTOLIC BLOOD PRESSURE: 75 MMHG

## 2019-06-28 DIAGNOSIS — E78.5 DYSLIPIDEMIA: ICD-10-CM

## 2019-06-28 DIAGNOSIS — E66.01 MORBID OBESITY (HCC): ICD-10-CM

## 2019-06-28 DIAGNOSIS — I10 ESSENTIAL HYPERTENSION: Primary | ICD-10-CM

## 2019-06-28 DIAGNOSIS — Z12.11 COLON CANCER SCREENING: ICD-10-CM

## 2019-06-28 DIAGNOSIS — E55.9 VITAMIN D DEFICIENCY: ICD-10-CM

## 2019-06-28 NOTE — PROGRESS NOTES
Patient is in the office today for a 6 month follow up. 1. Have you been to the ER, urgent care clinic since your last visit? Hospitalized since your last visit? No    2. Have you seen or consulted any other health care providers outside of the [address] [place] [address] since your last visit? Include any pap smears or colon screening.  No

## 2019-06-28 NOTE — PATIENT INSTRUCTIONS
High Blood Pressure: Care Instructions Overview It's normal for blood pressure to go up and down throughout the day. But if it stays up, you have high blood pressure. Another name for high blood pressure is hypertension. Despite what a lot of people think, high blood pressure usually doesn't cause headaches or make you feel dizzy or lightheaded. It usually has no symptoms. But it does increase your risk of stroke, heart attack, and other problems. You and your doctor will talk about your risks of these problems based on your blood pressure. Your doctor will give you a goal for your blood pressure. Your goal will be based on your health and your age. Lifestyle changes, such as eating healthy and being active, are always important to help lower blood pressure. You might also take medicine to reach your blood pressure goal. 
Follow-up care is a key part of your treatment and safety. Be sure to make and go to all appointments, and call your doctor if you are having problems. It's also a good idea to know your test results and keep a list of the medicines you take. How can you care for yourself at home? Medical treatment · If you stop taking your medicine, your blood pressure will go back up. You may take one or more types of medicine to lower your blood pressure. Be safe with medicines. Take your medicine exactly as prescribed. Call your doctor if you think you are having a problem with your medicine. · Talk to your doctor before you start taking aspirin every day. Aspirin can help certain people lower their risk of a heart attack or stroke. But taking aspirin isn't right for everyone, because it can cause serious bleeding. · See your doctor regularly. You may need to see the doctor more often at first or until your blood pressure comes down. · If you are taking blood pressure medicine, talk to your doctor before you take decongestants or anti-inflammatory medicine, such as ibuprofen. Some of these medicines can raise blood pressure. · Learn how to check your blood pressure at home. Lifestyle changes · Stay at a healthy weight. This is especially important if you put on weight around the waist. Losing even 10 pounds can help you lower your blood pressure. · If your doctor recommends it, get more exercise. Walking is a good choice. Bit by bit, increase the amount you walk every day. Try for at least 30 minutes on most days of the week. You also may want to swim, bike, or do other activities. · Avoid or limit alcohol. Talk to your doctor about whether you can drink any alcohol. · Try to limit how much sodium you eat to less than 2,300 milligrams (mg) a day. Your doctor may ask you to try to eat less than 1,500 mg a day. · Eat plenty of fruits (such as bananas and oranges), vegetables, legumes, whole grains, and low-fat dairy products. · Lower the amount of saturated fat in your diet. Saturated fat is found in animal products such as milk, cheese, and meat. Limiting these foods may help you lose weight and also lower your risk for heart disease. · Do not smoke. Smoking increases your risk for heart attack and stroke. If you need help quitting, talk to your doctor about stop-smoking programs and medicines. These can increase your chances of quitting for good. When should you call for help? Call 911 anytime you think you may need emergency care. This may mean having symptoms that suggest that your blood pressure is causing a serious heart or blood vessel problem. Your blood pressure may be over 180/120. 
 For example, call 911 if: 
  · You have symptoms of a heart attack. These may include: 
? Chest pain or pressure, or a strange feeling in the chest. 
? Sweating. ? Shortness of breath. ? Nausea or vomiting. ? Pain, pressure, or a strange feeling in the back, neck, jaw, or upper belly or in one or both shoulders or arms. ? Lightheadedness or sudden weakness. ? A fast or irregular heartbeat.  
  · You have symptoms of a stroke. These may include: 
? Sudden numbness, tingling, weakness, or loss of movement in your face, arm, or leg, especially on only one side of your body. ? Sudden vision changes. ? Sudden trouble speaking. ? Sudden confusion or trouble understanding simple statements. ? Sudden problems with walking or balance. ? A sudden, severe headache that is different from past headaches.  
  · You have severe back or belly pain.  
 Do not wait until your blood pressure comes down on its own. Get help right away. 
 Call your doctor now or seek immediate care if: 
  · Your blood pressure is much higher than normal (such as 180/120 or higher), but you don't have symptoms.  
  · You think high blood pressure is causing symptoms, such as: 
? Severe headache. 
? Blurry vision.  
 Watch closely for changes in your health, and be sure to contact your doctor if: 
  · Your blood pressure measures higher than your doctor recommends at least 2 times. That means the top number is higher or the bottom number is higher, or both.  
  · You think you may be having side effects from your blood pressure medicine. Where can you learn more? Go to http://marita-jessica.info/. Enter J670 in the search box to learn more about \"High Blood Pressure: Care Instructions. \" Current as of: July 22, 2018 Content Version: 11.9 © 5409-4513 [employer]. Care instructions adapted under license by [employer] (which disclaims liability or warranty for this information). If you have questions about a medical condition or this instruction, always ask your healthcare professional. [place] [address] any warranty or liability for your use of this information.

## 2019-06-28 NOTE — PROGRESS NOTES
Subjective:       Chief Complaint  The patient presents for follow up of hypertension and high cholesterol. Obesity, vit D deficiency         HPI  Mable Salmon is a 47 y.o. male seen for follow up of hyperlipidemia. Raj has hypertension. Hypertension well controlled, on Tenormin 50 mg. Patient unfortunately has regained significant weight, he has been stressed dealing with his wife who is ill, hyperlipidemia poorly controlled with gaining weight back pt unable to tolerate statins. Patient's going to try again to work on cutting back calories increase in exercise to lose weight. His BMI now is up to 42 which puts him at significant risk for multiple medical problems. Diet and Lifestyle: not attempting to follow a low fat, low cholesterol diet, sedentary    Home BP Monitoring: is not measured at home. Other symptoms and concerns: Pt's vit D level is not well controlled on current supplementation. Patient to take vitamin D 2000 units per day on a regular basis      Current Outpatient Medications   Medication Sig    atenolol (TENORMIN) 50 mg tablet TAKE 1 TABLET BY MOUTH DAILY    multivitamin (ONE A DAY) tablet Take 1 Tab by mouth daily. No current facility-administered medications for this visit.               Review of Systems  Respiratory: negative for dyspnea on exertion  Cardiovascular: negative for chest pain    Objective:     Visit Vitals  /75 (BP 1 Location: Left arm, BP Patient Position: Sitting)   Pulse 68   Temp 97.8 °F (36.6 °C) (Oral)   Resp 20   Ht 5' 8\" (1.727 m)   Wt 278 lb (126.1 kg)   SpO2 98%   BMI 42.27 kg/m²        General appearance - alert, well appearing, and in no distress  Neck - supple, no significant adenopathy, carotids upstroke normal bilaterally, no bruits  Chest - clear to auscultation, no wheezes, rales or rhonchi, symmetric air entry  Heart - normal rate, regular rhythm, normal S1, S2, no murmurs, rubs, clicks or gallops  Extremities - peripheral pulses normal, no pedal edema, no clubbing or cyanosis  Skin - normal coloration and turgor, no rashes, no suspicious skin lesions noted      Labs:   Lab Results   Component Value Date/Time    Cholesterol, total 294 (H) 08/09/2018 09:51 AM    HDL Cholesterol 38 (L) 08/09/2018 09:51 AM    LDL,Direct 184 (H) 08/09/2018 09:51 AM    LDL, calculated  08/09/2018 09:51 AM      Comment:      Triglyceride >400. LDL cannot be calculated. LDL Cholesterol Direct has been  ordered. Triglyceride 403 (H) 08/09/2018 09:51 AM     Lab Results   Component Value Date/Time    ALT (SGPT) 38 08/09/2018 09:51 AM    AST (SGOT) 31 08/09/2018 09:51 AM    Alk. phosphatase 66 08/09/2018 09:51 AM    Bilirubin, direct <0.2 08/09/2018 09:51 AM    Bilirubin, total 0.4 08/09/2018 09:51 AM     Lab Results   Component Value Date/Time    GFR est  06/29/2015 07:51 AM    GFR est non- 06/29/2015 07:51 AM    Creatinine 0.7 08/01/2017 01:54 AM    BUN 22 08/01/2017 01:54 AM    Sodium 140 08/01/2017 01:54 AM    Potassium 4.2 08/01/2017 01:54 AM    Chloride 101 08/01/2017 01:54 AM    CO2 24 08/01/2017 01:54 AM      New labs reviewed and scanned into chart. Assessment / Plan     Hypertension well controlled on Tenormin 50 mg daily  Hyperlipidemia poorly controlled with weight gain. Patient will try again to lose weight to control his cholesterol since he cannot tolerate statins. ICD-10-CM ICD-9-CM    1. Essential hypertension P61 086.2 METABOLIC PANEL, COMPREHENSIVE   2. Dyslipidemia E78.5 272.4 LIPID PANEL   3. Vitamin D deficiency E55.9 268.9 Uncontrolled. Pt to take vit D 2000 units/day on a regular basis VITAMIN D, 25 HYDROXY   4. Morbid obesity ([address] E66.01 278.01 Pt to work on diet and exercise to lose weight    5. Colon cancer screening Z12.11 V76.51 OCCULT BLOOD IMMUNOASSAY,DIAGNOSTIC             Low cholesterol diet, weight control and daily exercise discussed.      Follow-up and Dispositions    · Return in about 6 months (around 12/28/2019) for labs 1 week before. Reviewed plan of care. Patient has provided input and agrees with goals.

## 2019-07-16 LAB — HEMOCCULT STL QL IA: NEGATIVE

## 2019-08-08 RX ORDER — ATENOLOL 50 MG/1
TABLET ORAL
Qty: 30 TAB | Refills: 5 | Status: SHIPPED | OUTPATIENT
Start: 2019-08-08 | End: 2020-03-31

## 2019-10-02 DIAGNOSIS — J01.10 ACUTE NON-RECURRENT FRONTAL SINUSITIS: ICD-10-CM

## 2019-10-02 RX ORDER — MONTELUKAST SODIUM 10 MG/1
TABLET ORAL
Qty: 30 TAB | Refills: 5 | Status: SHIPPED | OUTPATIENT
Start: 2019-10-02 | End: 2021-04-28 | Stop reason: ALTCHOICE

## 2019-12-01 NOTE — PATIENT INSTRUCTIONS
Advance Directives: Care Instructions  Your Care Instructions  An advance directive is a legal way to state your wishes at the end of your life. It tells your family and your doctor what to do if you can no longer say what you want. There are two main types of advance directives. You can change them any time that your wishes change. · A living will tells your family and your doctor your wishes about life support and other treatment. · A durable power of  for health care lets you name a person to make treatment decisions for you when you can't speak for yourself. This person is called a health care agent. If you do not have an advance directive, decisions about your medical care may be made by a doctor or a  who doesn't know you. It may help to think of an advance directive as a gift to the people who care for you. If you have one, they won't have to make tough decisions by themselves. Follow-up care is a key part of your treatment and safety. Be sure to make and go to all appointments, and call your doctor if you are having problems. It's also a good idea to know your test results and keep a list of the medicines you take. How can you care for yourself at home? · Discuss your wishes with your loved ones and your doctor. This way, there are no surprises. · Many states have a unique form. Or you might use a universal form that has been approved by many states. This kind of form can sometimes be completed and stored online. Your electronic copy will then be available wherever you have a connection to the Internet. In most cases, doctors will respect your wishes even if you have a form from a different state. · You don't need a  to do an advance directive. But you may want to get legal advice. · Think about these questions when you prepare an advance directive:  ¨ Who do you want to make decisions about your medical care if you are not able to?  Many people choose a family member or close friend. ¨ Do you know enough about life support methods that might be used? If not, talk to your doctor so you understand. ¨ What are you most afraid of that might happen? You might be afraid of having pain, losing your independence, or being kept alive by machines. ¨ Where would you prefer to die? Choices include your home, a hospital, or a nursing home. ¨ Would you like to have information about hospice care to support you and your family? ¨ Do you want to donate organs when you die? ¨ Do you want certain [employer] practices performed before you die? If so, put your wishes in the advance directive. · Read your advance directive every year, and make changes as needed. When should you call for help? Be sure to contact your doctor if you have any questions. Where can you learn more? Go to http://maritaURXjessica.info/. Enter R264 in the search box to learn more about \"Advance Directives: Care Instructions. \"  Current as of: November 17, 2016  Content Version: 11.3  © 0565-9637 "[employer]. Care instructions adapted under license by [employer] (which disclaims liability or warranty for this information). If you have questions about a medical condition or this instruction, always ask your healthcare professional. [address] any warranty or liability for your use of this information. Neck: Exercises  Your Care Instructions  Here are some examples of typical rehabilitation exercises for your condition. Start each exercise slowly. Ease off the exercise if you start to have pain. Your doctor or physical therapist will tell you when you can start these exercises and which ones will work best for you. How to do the exercises  Note: Stretching should make you feel a gentle stretch, but no pain. Stop any strengthening exercise that makes pain worse. Neck stretch    1.  This stretch works best if you keep your shoulder down as you lean away from it. To help you remember to do this, start by relaxing your shoulders and lightly holding on to your thighs or your chair. 2. Tilt your head toward your shoulder and hold for 15 to 30 seconds. Let the weight of your head stretch your muscles. 3. If you would like a little added stretch, use your hand to gently and steadily pull your head toward your shoulder. For example, keeping your right shoulder down, lean your head to the left. 4. Repeat 2 to 4 times toward each shoulder. Diagonal neck stretch    1. Turn your head slightly toward the direction you will be stretching, and tilt your head diagonally toward your chest and hold for 15 to 30 seconds. 2. If you would like a little added stretch, use your hand to gently and steadily pull your head forward on the diagonal.  3. Repeat 2 to 4 times toward each side. Dorsal glide stretch    1. Sit or stand tall and look straight ahead. 2. Slowly tuck your chin as you glide your head backward over your body  3. Hold for a count of 6, and then relax for up to 10 seconds. 4. Repeat 8 to 12 times. Note: The dorsal glide stretches the back of the neck. If you feel pain, do not glide so far back. Some people find this exercise easier to do while lying on their backs with an ice pack on the neck. Chest and shoulder stretch    1. Sit or stand tall and glide your head backward as in the dorsal glide stretch. 2. Raise both arms so that your hands are next to your ears. 3. Take a deep breath, and as you breathe out, lower your elbows down and behind your back. You will feel your shoulder blades slide down and together, and at the same time you will feel a stretch across your chest and the front of your shoulders. 4. Hold for about 6 seconds, and then relax for up to 10 seconds. 5. Repeat 8 to 12 times. Strengthening: Hands on head    1.  Move your head backward, forward, and side to side against gentle pressure from your hands, holding each position for about 6 seconds. 2. Repeat 8 to 12 times. Follow-up care is a key part of your treatment and safety. Be sure to make and go to all appointments, and call your doctor if you are having problems. It's also a good idea to know your test results and keep a list of the medicines you take. Where can you learn more? Go to http://marita-jessica.info/. Enter P975 in the search box to learn more about \"Neck: Exercises. \"  Current as of: March 21, 2017  Content Version: 11.3  © 5446-4154 [employer]. Care instructions adapted under license by [employer] (which disclaims liability or warranty for this information). If you have questions about a medical condition or this instruction, always ask your healthcare professional. [address] any warranty or liability for your use of this information. Rotator Cuff: Exercises  Your Care Instructions  Here are some examples of typical rehabilitation exercises for your condition. Start each exercise slowly. Ease off the exercise if you start to have pain. Your doctor or physical therapist will tell you when you can start these exercises and which ones will work best for you. How to do the exercises  Pendulum swing    Note: If you have pain in your back, do not do this exercise. 5. Hold on to a table or the back of a chair with your good arm. Then bend forward a little and let your sore arm hang straight down. This exercise does not use the arm muscles. Rather, use your legs and your hips to create movement that makes your arm swing freely. 6. Use the movement from your hips and legs to guide the slightly swinging arm back and forth like a pendulum (or elephant trunk). Then guide it in circles that start small (about the size of a dinner plate). Make the circles a bit larger each day, as your pain allows. 7. Do this exercise for 5 minutes, 5 to 7 times each day.   8. As you have less pain, try bending over a little farther to do this exercise. This will increase the amount of movement at your shoulder. Posterior stretching exercise    4. Hold the elbow of your injured arm with your other hand. 5. Use your hand to pull your injured arm gently up and across your body. You will feel a gentle stretch across the back of your injured shoulder. 6. Hold for at least 15 to 30 seconds. Then slowly lower your arm. 7. Repeat 2 to 4 times. Up-the-back stretch    Note: Your doctor or physical therapist may want you to wait to do this stretch until you have regained most of your range of motion and strength. You can do this stretch in different ways. Hold any of these stretches for at least 15 to 30 seconds. Repeat them 2 to 4 times. 5. Put your hand in your back pocket. Let it rest there to stretch your shoulder. 6. With your other hand, hold your injured arm (palm outward) behind your back by the wrist. Pull your arm up gently to stretch your shoulder. 7. Next, put a towel over your other shoulder. Put the hand of your injured arm behind your back. Now hold the back end of the towel. With the other hand, hold the front end of the towel in front of your body. Pull gently on the front end of the towel. This will bring your hand farther up your back to stretch your shoulder. Overhead stretch    6. Standing about an arm's length away, grasp onto a solid surface. You could use a countertop, a doorknob, or the back of a sturdy chair. 7. With your knees slightly bent, bend forward with your arms straight. Lower your upper body, and let your shoulders stretch. 8. As your shoulders are able to stretch farther, you may need to take a step or two backward. 9. Hold for at least 15 to 30 seconds. Then stand up and relax. If you had stepped back during your stretch, step forward so you can keep your hands on the solid surface. 10. Repeat 2 to 4 times.   Shoulder flexion (lying down)    Note: To make a wand for this exercise, use a piece of PVC pipe or a broom handle with the broom removed. Make the wand about a foot wider than your shoulders. 3. Lie on your back, holding a wand with both hands. Your palms should face down as you hold the wand. 4. Keeping your elbows straight, slowly raise your arms over your head. Raise them until you feel a stretch in your shoulders, upper back, and chest.  5. Hold for 15 to 30 seconds. 6. Repeat 2 to 4 times. Shoulder rotation (lying down)    Note: To make a wand for this exercise, use a piece of PVC pipe or a broom handle with the broom removed. Make the wand about a foot wider than your shoulders. 1. Lie on your back. Hold a wand with both hands with your elbows bent and palms up. 2. Keep your elbows close to your body, and move the wand across your body toward the sore arm. 3. Hold for 8 to 12 seconds. 4. Repeat 2 to 4 times. Wall climbing (to the side)    Note: Avoid any movement that is straight to your side, and be careful not to arch your back. Your arm should stay about 30 degrees to the front of your side. 1. Stand with your side to a wall so that your fingers can just touch it at an angle about 30 degrees toward the front of your body. 2. Walk the fingers of your injured arm up the wall as high as pain permits. Try not to shrug your shoulder up toward your ear as you move your arm up. 3. Hold that position for a count of at least 15 to 20.  4. Walk your fingers back down to the starting position. 5. Repeat at least 2 to 4 times. Try to reach higher each time. Wall climbing (to the front)    Note: During this stretching exercise, be careful not to arch your back. 1. Face a wall, and stand so your fingers can just touch it. 2. Keeping your shoulder down, walk the fingers of your injured arm up the wall as high as pain permits. (Don't shrug your shoulder up toward your ear.)  3. Hold your arm in that position for at least 15 to 30 seconds.   4. Slowly walk your fingers back down to where you started. 5. Repeat at least 2 to 4 times. Try to reach higher each time. Shoulder blade squeeze    1. Stand with your arms at your sides, and squeeze your shoulder blades together. Do not raise your shoulders up as you squeeze. 2. Hold 6 seconds. 3. Repeat 8 to 12 times. Scapular exercise: Arm reach    1. Lie flat on your back. This exercise is a very slight motion that starts with your arms raised (elbows straight, arms straight). 2. From this position, reach higher toward the snow or ceiling. Keep your elbows straight. All motion should be from your shoulder blade only. 3. Relax your arms back to where you started. 4. Repeat 8 to 12 times. Arm raise to the side    Note: During this strengthening exercise, your arm should stay about 30 degrees to the front of your side. 1. Slowly raise your injured arm to the side, with your thumb facing up. Raise your arm 60 degrees at the most (shoulder level is 90 degrees). 2. Hold the position for 3 to 5 seconds. Then lower your arm back to your side. If you need to, bring your \"good\" arm across your body and place it under the elbow as you lower your injured arm. Use your good arm to keep your injured arm from dropping down too fast.  3. Repeat 8 to 12 times. 4. When you first start out, don't hold any extra weight in your hand. As you get stronger, you may use a 1-pound to 2-pound dumbbell or a small can of food. Shoulder flexor and extensor exercise    Note: These are isometric exercises. That means you contract your muscles without actually moving. · Push forward (flex): Stand facing a wall or doorjamb, about 6 inches or less back. Hold your injured arm against your body. Make a closed fist with your thumb on top. Then gently push your hand forward into the wall with about 25% to 50% of your strength. Don't let your body move backward as you push. Hold for about 6 seconds. Relax for a few seconds. Repeat 8 to 12 times.   · Push backward (extend): Stand with your back flat against a wall. Your upper arm should be against the wall, with your elbow bent 90 degrees (your hand straight ahead). Push your elbow gently back against the wall with about 25% to 50% of your strength. Don't let your body move forward as you push. Hold for about 6 seconds. Relax for a few seconds. Repeat 8 to 12 times. Scapular exercise: Wall push-ups    Note: This exercise is best done with your fingers somewhat turned out, rather than straight up and down. 1. Stand facing a wall, about 12 inches to 18 inches away. 2. Place your hands on the wall at shoulder height. 3. Slowly bend your elbows and bring your face to the wall. Keep your back and hips straight. 4. Push back to where you started. 5. Repeat 8 to 12 times. 6. When you can do this exercise against a wall comfortably, you can try it against a counter. You can then slowly progress to the end of a couch, then to a sturdy chair, and finally to the floor. Scapular exercise: Retraction    Note: For this exercise, you will need elastic exercise material, such as surgical tubing or Thera-Band. 1. Put the band around a solid object at about waist level. (A bedpost will work well.) Each hand should hold an end of the band. 2. With your elbows at your sides and bent to 90 degrees, pull the band back. Your shoulder blades should move toward each other. Then move your arms back where you started. 3. Repeat 8 to 12 times. 4. If you have good range of motion in your shoulders, try this exercise with your arms lifted out to the sides. Keep your elbows at a 90-degree angle. Raise the elastic band up to about shoulder level. Pull the band back to move your shoulder blades toward each other. Then move your arms back where you started. Internal rotator strengthening exercise    1. Start by tying a piece of elastic exercise material to a doorknob. You can use surgical tubing or Thera-Band.   2. Stand or sit with your shoulder relaxed and your elbow bent 90 degrees. Your upper arm should rest comfortably against your side. Squeeze a rolled towel between your elbow and your body for comfort. This will help keep your arm at your side. 3. Hold one end of the elastic band in the hand of the painful arm. 4. Slowly rotate your forearm toward your body until it touches your belly. Slowly move it back to where you started. 5. Keep your elbow and upper arm firmly tucked against the towel roll or at your side. 6. Repeat 8 to 12 times. External rotator strengthening exercise    1. Start by tying a piece of elastic exercise material to a doorknob. You can use surgical tubing or Thera-Band. (You may also hold one end of the band in each hand.)  2. Stand or sit with your shoulder relaxed and your elbow bent 90 degrees. Your upper arm should rest comfortably against your side. Squeeze a rolled towel between your elbow and your body for comfort. This will help keep your arm at your side. 3. Hold one end of the elastic band with the hand of the painful arm. 4. Start with your forearm across your belly. Slowly rotate the forearm out away from your body. Keep your elbow and upper arm tucked against the towel roll or the side of your body until you begin to feel tightness in your shoulder. Slowly move your arm back to where you started. 5. Repeat 8 to 12 times. Follow-up care is a key part of your treatment and safety. Be sure to make and go to all appointments, and call your doctor if you are having problems. It's also a good idea to know your test results and keep a list of the medicines you take. Where can you learn more? Go to http://marita-jessica.info/. Enter Roderick Gerber in the search box to learn more about \"Rotator Cuff: Exercises. \"  Current as of: March 21, 2017  Content Version: 11.3  © 5526-0312 [employer]. Care instructions adapted under license by [employer] (which disclaims liability or warranty for this information). If you have questions about a medical condition or this instruction, always ask your healthcare professional. [place] [address] any warranty or liability for your use of this information. detailed exam pale/warm and dry

## 2019-12-27 DIAGNOSIS — E78.5 DYSLIPIDEMIA: ICD-10-CM

## 2019-12-27 DIAGNOSIS — I10 ESSENTIAL HYPERTENSION: ICD-10-CM

## 2019-12-29 DIAGNOSIS — E55.9 VITAMIN D DEFICIENCY: ICD-10-CM

## 2020-05-19 ENCOUNTER — APPOINTMENT (OUTPATIENT)
Dept: INTERNAL MEDICINE CLINIC | Age: 55
End: 2020-05-19

## 2020-05-20 LAB
A-G RATIO,AGRAT: 2.1 RATIO (ref 1.1–2.6)
ALBUMIN SERPL-MCNC: 4.5 G/DL (ref 3.5–5)
ALP SERPL-CCNC: 68 U/L (ref 25–115)
ALT SERPL-CCNC: 45 U/L (ref 5–40)
ANION GAP SERPL CALC-SCNC: 13 MMOL/L
AST SERPL W P-5'-P-CCNC: 30 U/L (ref 10–37)
BILIRUB SERPL-MCNC: 0.3 MG/DL (ref 0.2–1.2)
BUN SERPL-MCNC: 15 MG/DL (ref 6–22)
CALCIUM SERPL-MCNC: 9.5 MG/DL (ref 8.4–10.5)
CHLORIDE SERPL-SCNC: 101 MMOL/L (ref 98–110)
CHOLEST SERPL-MCNC: 280 MG/DL (ref 110–200)
CO2 SERPL-SCNC: 25 MMOL/L (ref 20–32)
CREAT SERPL-MCNC: 0.9 MG/DL (ref 0.5–1.2)
GFRAA, 66117: >60
GFRNA, 66118: >60
GLOBULIN,GLOB: 2.1 G/DL (ref 2–4)
GLUCOSE SERPL-MCNC: 113 MG/DL (ref 70–99)
HDLC SERPL-MCNC: 30 MG/DL
HDLC SERPL-MCNC: 9.3 MG/DL (ref 0–5)
LDL, DIRECT,DLDL: 147 MG/DL (ref 50–99)
LDL/HDL RATIO,LDHD: 4.9
LDLC SERPL CALC-MCNC: ABNORMAL MG/DL
NON-HDL CHOLESTEROL, 011976: 250 MG/DL
POTASSIUM SERPL-SCNC: 4.8 MMOL/L (ref 3.5–5.5)
PROT SERPL-MCNC: 6.6 G/DL (ref 6.4–8.3)
SODIUM SERPL-SCNC: 139 MMOL/L (ref 133–145)
TRIGL SERPL-MCNC: 539 MG/DL (ref 40–149)
VLDLC SERPL CALC-MCNC: 108 MG/DL (ref 8–30)

## 2020-05-26 ENCOUNTER — VIRTUAL VISIT (OUTPATIENT)
Dept: INTERNAL MEDICINE CLINIC | Age: 55
End: 2020-05-26

## 2020-05-26 DIAGNOSIS — I10 ESSENTIAL HYPERTENSION: Primary | ICD-10-CM

## 2020-05-26 DIAGNOSIS — Z12.5 PROSTATE CANCER SCREENING: ICD-10-CM

## 2020-05-26 DIAGNOSIS — E55.9 VITAMIN D DEFICIENCY: ICD-10-CM

## 2020-05-26 DIAGNOSIS — E78.5 DYSLIPIDEMIA: ICD-10-CM

## 2020-05-26 DIAGNOSIS — E66.01 MORBID OBESITY (HCC): ICD-10-CM

## 2020-05-26 RX ORDER — FLUTICASONE PROPIONATE 50 MCG
2 SPRAY, SUSPENSION (ML) NASAL DAILY
Qty: 1 BOTTLE | Refills: 5 | Status: SHIPPED | OUTPATIENT
Start: 2020-05-26 | End: 2022-07-20 | Stop reason: SDUPTHER

## 2020-05-26 NOTE — PROGRESS NOTES
Hetty Saint 54 y.o. male who was seen by synchronous (real-time) audio-video technology on 05/26/20    Consent:  heand/or his healthcare decision maker is aware that this patient-initiated Telehealth encounter is a billable service, with coverage as determined by her insurance carrier. he is aware that he may receive a bill and has provided verbal consent to proceed: Yes I was in my office while conducting this encounter. The patient was in their home. Subjective:       Chief Complaint  The patient presents for follow up of hypertension and high cholesterol. Obesity, vit D deficiency         HPI  Hetty Saint is a 54 y.o. male seen for follow up of hyperlipidemia. Raj has hypertension. Hypertension well controlled, on Tenormin 50 mg. Patient unfortunately has regained significant weight, he has been stressed dealing with his wife who is ill, hyperlipidemia poorly controlled with gaining weight back pt unable to tolerate statins. Patient's going to try again to work on cutting back calories with juicing which seems to work for him . His BMI now is up to 42 which puts him at significant risk for multiple medical problems. Diet and Lifestyle: not attempting to follow a low fat, low cholesterol diet, sedentary    Home BP Monitoring: is not measured at home. Other symptoms and concerns: Pt's vit D level is not well controlled on current supplementation. Patient to take vitamin D 5000 units per day on a regular basis      Current Outpatient Medications   Medication Sig    fluticasone propionate (FLONASE) 50 mcg/actuation nasal spray 2 Sprays by Both Nostrils route daily.  atenoloL (TENORMIN) 50 mg tablet TAKE 1 TABLET BY MOUTH EVERY DAY    montelukast (SINGULAIR) 10 mg tablet TAKE 1 TABLET BY MOUTH EVERY DAY    multivitamin (ONE A DAY) tablet Take 1 Tab by mouth daily. No current facility-administered medications for this visit.               Review of Systems  Respiratory: negative for dyspnea on exertion  Cardiovascular: negative for chest pain    Objective: There were no vitals taken for this visit. General appearance - alert, well appearing, and in no distress        Labs:   Lab Results   Component Value Date/Time    Cholesterol, total 280 (H) 05/19/2020 08:48 AM    HDL Cholesterol 30 (L) 05/19/2020 08:48 AM    LDL,Direct 147 (H) 05/19/2020 08:48 AM    LDL, calculated  05/19/2020 08:48 AM      Comment:      Triglyceride value is too high to all calculation for the calculated LDL. Direct LDL is being performed. Triglyceride 539 (H) 05/19/2020 08:48 AM     Lab Results   Component Value Date/Time    ALT (SGPT) 45 (H) 05/19/2020 08:48 AM    AST (SGOT) 30 05/19/2020 08:48 AM    Alk. phosphatase 68 05/19/2020 08:48 AM    Bilirubin, direct <0.2 08/09/2018 09:51 AM    Bilirubin, total 0.3 05/19/2020 08:48 AM     Lab Results   Component Value Date/Time    GFR est  06/29/2015 07:51 AM    GFR est non- 06/29/2015 07:51 AM    Creatinine 0.9 05/19/2020 08:48 AM    BUN 15 05/19/2020 08:48 AM    Sodium 139 05/19/2020 08:48 AM    Potassium 4.8 05/19/2020 08:48 AM    Chloride 101 05/19/2020 08:48 AM    CO2 25 05/19/2020 08:48 AM      New labs reviewed and scanned into chart. Assessment / Plan     Hypertension well controlled on Tenormin 50 mg daily  Hyperlipidemia poorly controlled with weight gain. Patient will try again to lose weight to control his cholesterol since he cannot tolerate statins. He will consider a Heart Scan and may need Vascepa and or Repatha if abnormal.       ICD-10-CM ICD-9-CM    1. Essential hypertension H74 011.1 METABOLIC PANEL, COMPREHENSIVE   2. Dyslipidemia E78.5 272.4 LIPID PANEL   3. Vitamin D deficiency E55.9 268.9 Status unknown. Pt to continue vit D 5000 units/day VITAMIN D, 25 HYDROXY   4. Morbid obesity ([address] E66.01 278.01 Pt will continue to work on cutting back starches and sweets in his diet    5.  Prostate cancer screening Z12.5 V76.44 PSA, DIAGNOSTIC (PROSTATE SPECIFIC AG)             Low cholesterol diet, weight control and daily exercise discussed. Follow-up and Dispositions    · Return in about 4 months (around 9/26/2020) for labs 1 week before. Reviewed plan of care. Patient has provided input and agrees with goals.

## 2020-09-16 ENCOUNTER — OFFICE VISIT (OUTPATIENT)
Dept: ORTHOPEDIC SURGERY | Age: 55
End: 2020-09-16

## 2020-09-16 VITALS
TEMPERATURE: 96.9 F | HEART RATE: 55 BPM | WEIGHT: 296.2 LBS | HEIGHT: 68 IN | BODY MASS INDEX: 44.89 KG/M2 | DIASTOLIC BLOOD PRESSURE: 72 MMHG | SYSTOLIC BLOOD PRESSURE: 159 MMHG | OXYGEN SATURATION: 100 %

## 2020-09-16 DIAGNOSIS — M75.51 CHRONIC BURSITIS OF RIGHT SHOULDER: Primary | ICD-10-CM

## 2020-09-16 DIAGNOSIS — M75.21 BICEPS TENDINITIS OF RIGHT UPPER EXTREMITY: ICD-10-CM

## 2020-09-16 DIAGNOSIS — M25.511 ACUTE PAIN OF RIGHT SHOULDER: ICD-10-CM

## 2020-09-16 RX ORDER — BETAMETHASONE SODIUM PHOSPHATE AND BETAMETHASONE ACETATE 3; 3 MG/ML; MG/ML
6 INJECTION, SUSPENSION INTRA-ARTICULAR; INTRALESIONAL; INTRAMUSCULAR; SOFT TISSUE ONCE
Qty: 0.5 ML | Refills: 0
Start: 2020-09-16 | End: 2020-09-16

## 2020-09-16 NOTE — PROGRESS NOTES
Patient: Luz Soto                MRN: [id number]       SSN: [id number]  YOB: 1965        AGE: 54 y.o. SEX: male    PCP: Karlie King MD  09/16/20    Chief Complaint   Patient presents with    Shoulder Pain     right shoulder pain     HISTORY:  Luz Soto is a 54 y.o. male who is seen for right shoulder pain. He has been experiencing pain for the past on and off for the past two years. He has had increased pain for the past several weeks. He reports no h/o injury. He noted shoulder pain with overhead activities and at night. He feels a shooting pain when reaching behind him. He previously responded to a right shoulder injection. He is right handed. He was previously seen for right thumb pain. He is s/p a right CTR by Dr. Kathy Villaseñor on 8-12-15 - doing well. He is s/p right trigger thumb release on 8/26/18. 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 He lives with his wife in [place]. He does not have any children. His wife had head and neck cancer. He does not play any sports. He walks for exercise. He gained weight due to the Coronavirus shutdown. Current weight is 296 pounds. He is 5'8\" tall.       No results found for: HBA1C, HGBE8, VKA9LSGC, VDD7TDJM, POR5FMRD  Weight Metrics 9/16/2020 6/28/2019 12/26/2018 8/27/2018 8/14/2018 8/13/2018 8/9/2018   Weight 296 lb 3.2 oz 278 lb 261 lb 9.6 oz 250 lb 259 lb 255 lb 6.4 oz 253 lb   BMI 45.04 kg/m2 42.27 kg/m2 39.78 kg/m2 38.01 kg/m2 39.38 kg/m2 38.83 kg/m2 38.47 kg/m2 Patient Active Problem List   Diagnosis Code    Hypertension I10    VENTRAL HERNIA     High cholesterol E78.00    High triglycerides E78.1    Obesity E66.9    Bilateral carpal tunnel syndrome G56.03    Vitamin D deficiency E55.9    Dyslipidemia E78.5    Severe obesity (BMI 35.0-39. 9) E66.01     REVIEW OF SYSTEMS: All Below are Negative except: See HPI   Constitutional: negative for fever, chills, and weight loss. Cardiovascular: negative for chest pain, claudication, leg swelling, SOB, FROST   Gastrointestinal: Negative for pain, N/V/C/D, Blood in stool or urine, dysuria,  hematuria, incontinence, pelvic pain. Musculoskeletal: See HPI   Neurological: Negative for dizziness and weakness. Negative for headaches, Visual changes, confusion, seizures   Phychiatric/Behavioral: Negative for depression, memory loss, substance  abuse. Extremities: Negative for hair changes, rash, or skin lesion changes. Hematologic: Negative for bleeding problems, bruising, pallor or swollen lymph  nodes   Peripheral Vascular: No calf pain, no circulation deficits.     Social History     Socioeconomic History    Marital status:      Spouse name: Not on file    Number of children: Not on file    Years of education: Not on file    Highest education level: Not on file   Occupational History    Not on file   Social Needs    Financial resource strain: Not on file    Food insecurity     Worry: Not on file     Inability: Not on file    Transportation needs     Medical: Not on file     Non-medical: Not on file   Tobacco Use    Smoking status: Never Smoker    Smokeless tobacco: Never Used   Substance and Sexual Activity    Alcohol use: No    Drug use: No    Sexual activity: Yes     Partners: Female   Lifestyle    Physical activity     Days per week: Not on file     Minutes per session: Not on file    Stress: Not on file   Relationships    Social connections     Talks on phone: Not on file     Gets together: Not on file     Attends Bahai service: Not on file     Active member of club or organization: Not on file     Attends meetings of clubs or organizations: Not on file     Relationship status: Not on file    Intimate partner violence     Fear of current or ex partner: Not on file     Emotionally abused: Not on file     Physically abused: Not on file     Forced sexual activity: Not on file   Other Topics Concern    Not on file   Social History Narrative    Not on file      Allergies   Allergen Reactions    Statins-Hmg-Coa Reductase Inhibitors Myalgia    Tricor [Fenofibrate Micronized] Myalgia      Current Outpatient Medications   Medication Sig    atenoloL (TENORMIN) 50 mg tablet TAKE 1 TABLET BY MOUTH EVERY DAY    multivitamin (ONE A DAY) tablet Take 1 Tab by mouth daily.  fluticasone propionate (FLONASE) 50 mcg/actuation nasal spray 2 Sprays by Both Nostrils route daily.  montelukast (SINGULAIR) 10 mg tablet TAKE 1 TABLET BY MOUTH EVERY DAY     No current facility-administered medications for this visit.        PHYSICAL EXAMINATION:  Visit Vitals  BP (!) 159/72 (BP 1 Location: Right arm, BP Patient Position: Sitting)   Pulse (!) 55   Temp 96.9 °F (36.1 °C)   Ht 5' 8\" (1.727 m)   Wt 296 lb 3.2 oz (134.4 kg)   SpO2 100%   BMI 45.04 kg/m²   ORTHO EXAMINATION:  Examination Right shoulder Left shoulder   Skin Intact Intact   Effusion - -   Biceps deformity - -   Atrophy - -   AC joint tenderness - -   Acromial tenderness + +   Biceps tenderness + -   Forward flexion/Elevation  170   Active abduction  170   External rotation ROM 40 50   Internal rotation ROM 70 90   Apprehension - -   Impingement - -   Drop Arm Test - -   Neurovascular Intact Intact     Chart reviewed for the following:   Martin TINSLEY MD, have reviewed the History, Physical and updated the Allergic reactions for [address] performed immediately prior to start of procedure:  Vero Heath Nicholas Marx MD, have performed the following reviews on Lora Segovia prior to the start of the procedure:            * Patient was identified by name and date of birth   * Agreement on procedure being performed was verified  * Risks and Benefits explained to the patient  * Procedure site verified and marked as necessary  * Patient was positioned for comfort  * Consent was obtained     Time: 9:10 AM    Date of procedure: 9/16/2020    Procedure performed by:  Letty Hernandez MD     [facility] tolerated the procedure well with no complications. RADIOGRAPHS:  XR RIGHT SHOULDER 9/16/20 ESTRELLA  IMPRESSION:  Three views - No fractures, mild acromioclavicular narrowing, no glenohumeral narrowing, no calcific densities. IMPRESSION:      ICD-10-CM ICD-9-CM    1. Chronic bursitis of right shoulder  M75.51 726.10    2. Acute pain of right shoulder  M25.511 719.41 AMB POC XRAY, SHOULDER; COMPLETE, 2+   3. Biceps tendinitis of right upper extremity  M75.21 726.12      PLAN:   After discussing treatment options, patient's right shoulder was injected with 4 cc Marcaine and 1/2 cc Celestone. There is no need for surgery at this time. We discussed a possible need for right shoulder MRI in the future if pain continues. He will follow up as needed. Weight loss options discussed.     Scribed by Denia Sena  ([address]) as dictated by Letty Hernandez MD

## 2020-09-18 ENCOUNTER — APPOINTMENT (OUTPATIENT)
Dept: INTERNAL MEDICINE CLINIC | Age: 55
End: 2020-09-18

## 2020-09-18 DIAGNOSIS — I10 ESSENTIAL HYPERTENSION: ICD-10-CM

## 2020-09-18 DIAGNOSIS — E55.9 VITAMIN D DEFICIENCY: ICD-10-CM

## 2020-09-18 DIAGNOSIS — Z12.5 PROSTATE CANCER SCREENING: ICD-10-CM

## 2020-09-18 DIAGNOSIS — E78.5 DYSLIPIDEMIA: ICD-10-CM

## 2020-09-19 LAB
25(OH)D3 SERPL-MCNC: 53.7 NG/ML (ref 32–100)
A-G RATIO,AGRAT: 2.1 RATIO (ref 1.1–2.6)
ALBUMIN SERPL-MCNC: 4.4 G/DL (ref 3.5–5)
ALP SERPL-CCNC: 62 U/L (ref 25–115)
ALT SERPL-CCNC: 31 U/L (ref 5–40)
ANION GAP SERPL CALC-SCNC: 10 MMOL/L (ref 3–15)
AST SERPL W P-5'-P-CCNC: 21 U/L (ref 10–37)
BILIRUB SERPL-MCNC: 0.3 MG/DL (ref 0.2–1.2)
BUN SERPL-MCNC: 16 MG/DL (ref 6–22)
CALCIUM SERPL-MCNC: 9.4 MG/DL (ref 8.4–10.5)
CHLORIDE SERPL-SCNC: 103 MMOL/L (ref 98–110)
CHOLEST SERPL-MCNC: 251 MG/DL (ref 110–200)
CO2 SERPL-SCNC: 26 MMOL/L (ref 20–32)
CREAT SERPL-MCNC: 0.8 MG/DL (ref 0.5–1.2)
GFRAA, 66117: >60
GFRNA, 66118: >60
GLOBULIN,GLOB: 2.1 G/DL (ref 2–4)
GLUCOSE SERPL-MCNC: 93 MG/DL (ref 70–99)
HDLC SERPL-MCNC: 35 MG/DL
HDLC SERPL-MCNC: 7.2 MG/DL (ref 0–5)
LDL/HDL RATIO,LDHD: 4.5
LDLC SERPL CALC-MCNC: 158 MG/DL (ref 50–99)
NON-HDL CHOLESTEROL, 011976: 216 MG/DL
POTASSIUM SERPL-SCNC: 4.6 MMOL/L (ref 3.5–5.5)
PROT SERPL-MCNC: 6.5 G/DL (ref 6.4–8.3)
PSA SERPL-MCNC: 0.52 NG/ML
SODIUM SERPL-SCNC: 139 MMOL/L (ref 133–145)
TRIGL SERPL-MCNC: 287 MG/DL (ref 40–149)
VLDLC SERPL CALC-MCNC: 57 MG/DL (ref 8–30)

## 2020-10-07 ENCOUNTER — OFFICE VISIT (OUTPATIENT)
Dept: INTERNAL MEDICINE CLINIC | Age: 55
End: 2020-10-07
Payer: COMMERCIAL

## 2020-10-07 VITALS
OXYGEN SATURATION: 98 % | WEIGHT: 292 LBS | HEIGHT: 68 IN | SYSTOLIC BLOOD PRESSURE: 161 MMHG | RESPIRATION RATE: 18 BRPM | TEMPERATURE: 97.4 F | BODY MASS INDEX: 44.25 KG/M2 | HEART RATE: 65 BPM | DIASTOLIC BLOOD PRESSURE: 86 MMHG

## 2020-10-07 DIAGNOSIS — I10 ESSENTIAL HYPERTENSION: Primary | ICD-10-CM

## 2020-10-07 DIAGNOSIS — E66.01 MORBID OBESITY (HCC): ICD-10-CM

## 2020-10-07 DIAGNOSIS — E78.5 DYSLIPIDEMIA: ICD-10-CM

## 2020-10-07 DIAGNOSIS — E55.9 VITAMIN D DEFICIENCY: ICD-10-CM

## 2020-10-07 PROCEDURE — 99214 OFFICE O/P EST MOD 30 MIN: CPT | Performed by: INTERNAL MEDICINE

## 2020-10-07 RX ORDER — LOSARTAN POTASSIUM 100 MG/1
100 TABLET ORAL DAILY
Qty: 90 TAB | Refills: 3 | Status: SHIPPED | OUTPATIENT
Start: 2020-10-07 | End: 2021-01-10

## 2020-10-07 NOTE — PROGRESS NOTES
Flaca Romero presents today for   Chief Complaint   Patient presents with    Follow-up     4 mo f/u    Hypertension    Labs     done on 9-18-20              Coordination of Care:  1. Have you been to the ER, urgent care clinic since your last visit? Hospitalized since your last visit? no    2. Have you seen or consulted any other health care providers outside of the [address] [place] [address] since your last visit? Include any pap smears or colon screening. no      Advance Directive:  1. Do you have an advance directive in place? Patient Reply:no    2. If not, would you like material regarding how to put one in place?  Patient Reply: no

## 2020-10-12 RX ORDER — ATENOLOL 50 MG/1
50 TABLET ORAL DAILY
Qty: 90 TAB | Refills: 1 | Status: SHIPPED | OUTPATIENT
Start: 2020-10-12 | End: 2021-04-11 | Stop reason: SDUPTHER

## 2020-10-12 NOTE — TELEPHONE ENCOUNTER
Last Visit: 10/7/20 with MD Linda  Next Appointment: 1/7/21 with MD Linda  Previous Refill Encounter(s): 5/24/20 #30 with 5 refills    Requested Prescriptions     Pending Prescriptions Disp Refills    atenoloL (TENORMIN) 50 mg tablet 90 Tab 1     Sig: Take 1 Tab by mouth daily.

## 2020-10-14 ENCOUNTER — HOSPITAL ENCOUNTER (OUTPATIENT)
Dept: CT IMAGING | Age: 55
Discharge: HOME | End: 2020-10-14

## 2020-10-14 DIAGNOSIS — Z13.6 SCREENING FOR CARDIOVASCULAR CONDITION: ICD-10-CM

## 2020-10-14 PROCEDURE — 75571 CT HRT W/O DYE W/CA TEST: CPT

## 2020-10-14 NOTE — PROGRESS NOTES
Subjective:       Chief Complaint  The patient presents for follow up of hypertension and high cholesterol. Obesity, vit D deficiency         HPI  Karen Ken is a 54 y.o. male seen for follow up of hyperlipidemia. Healso has hypertension. Hypertension uncontrolled, on Tenormin 50 mg. 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 Patient's going to try again to work on cutting back calories increase in exercise to lose weight. His BMI now is up to 40 which puts him at significant risk for multiple medical problems. Diet and Lifestyle: not attempting to follow a low fat, low cholesterol diet, sedentary    Home BP Monitoring: is not measured at home. Other symptoms and concerns: Pt's vit D level is well controlled on current supplementation. Patient to take vitamin D 2000 units per day on a regular basis    Patient for his colon cancer screening health maintenance will do FIT test through his insurance company. He is reluctant to do colonoscopy at this time. Current Outpatient Medications   Medication Sig    losartan (COZAAR) 100 mg tablet Take 1 Tab by mouth daily.  fluticasone propionate (FLONASE) 50 mcg/actuation nasal spray 2 Sprays by Both Nostrils route daily.  multivitamin (ONE A DAY) tablet Take 1 Tab by mouth daily.  atenoloL (TENORMIN) 50 mg tablet Take 1 Tab by mouth daily.  montelukast (SINGULAIR) 10 mg tablet TAKE 1 TABLET BY MOUTH EVERY DAY     No current facility-administered medications for this visit.               Review of Systems  Respiratory: negative for dyspnea on exertion  Cardiovascular: negative for chest pain    Objective:     Visit Vitals  BP (!) 161/86   Pulse 65   Temp 97.4 °F (36.3 °C) (Temporal)   Resp 18   Ht 5' 8\" (1.727 m)   Wt 292 lb (132.5 kg)   SpO2 98%   BMI 44.40 kg/m²        General appearance - alert, well appearing, and in no distress  Neck - supple, no significant adenopathy, carotids upstroke normal bilaterally, no bruits  Chest - clear to auscultation, no wheezes, rales or rhonchi, symmetric air entry  Heart - normal rate, regular rhythm, normal S1, S2, no murmurs, rubs, clicks or gallops  Extremities - peripheral pulses normal, no pedal edema, no clubbing or cyanosis  Skin - normal coloration and turgor, no rashes, no suspicious skin lesions noted      Labs:   Lab Results   Component Value Date/Time    Cholesterol, total 251 (H) 09/18/2020 09:10 AM    HDL Cholesterol 35 (L) 09/18/2020 09:10 AM    LDL,Direct 147 (H) 05/19/2020 08:48 AM    LDL, calculated 158 (H) 09/18/2020 09:10 AM    Triglyceride 287 (H) 09/18/2020 09:10 AM     Lab Results   Component Value Date/Time    ALT (SGPT) 31 09/18/2020 09:10 AM    Alk. phosphatase 62 09/18/2020 09:10 AM    Bilirubin, direct <0.2 08/09/2018 09:51 AM    Bilirubin, total 0.3 09/18/2020 09:10 AM     Lab Results   Component Value Date/Time    GFR est  06/29/2015 07:51 AM    GFR est non- 06/29/2015 07:51 AM    Creatinine 0.8 09/18/2020 09:10 AM    BUN 16 09/18/2020 09:10 AM    Sodium 139 09/18/2020 09:10 AM    Potassium 4.6 09/18/2020 09:10 AM    Chloride 103 09/18/2020 09:10 AM    CO2 26 09/18/2020 09:10 AM      New labs reviewed and scanned into chart. Assessment / Plan     Hypertension uncontrolled on Tenormin 50 mg daily. Will add cozaar 100 mg daily   Hyperlipidemia poorly controlled with weight gain. Patient will try again to lose weight to control his cholesterol since he cannot tolerate statins. Patient to consider doing a heart scan to see if he may qualify for medication like Repatha      ICD-10-CM ICD-9-CM    1. Essential hypertension  B80 327.1 METABOLIC PANEL, COMPREHENSIVE   2.  Dyslipidemia  E78.5 272.4 LIPID PANEL   3. Morbid obesity ([address]  E66.01 278.01  patient will work on trying to cut back starches and sweets in his diet to lose weight and improve general health   4. Vitamin D deficiency  E55.9 268.9  well-controlled current supplementation. VITAMIN D, 25 HYDROXY             Low cholesterol diet, weight control and daily exercise discussed. Follow-up and Dispositions    · Return in about 3 months (around 1/7/2021) for labs 1 week before. Reviewed plan of care. Patient has provided input and agrees with goals.

## 2020-10-20 ENCOUNTER — TELEPHONE (OUTPATIENT)
Dept: OTHER | Age: 55
End: 2020-10-20

## 2020-11-04 ENCOUNTER — TELEPHONE (OUTPATIENT)
Dept: OTHER | Age: 55
End: 2020-11-04

## 2021-01-10 RX ORDER — LOSARTAN POTASSIUM 100 MG/1
TABLET ORAL
Qty: 90 TAB | Refills: 3 | Status: SHIPPED | OUTPATIENT
Start: 2021-01-10 | End: 2021-07-06

## 2021-01-14 DIAGNOSIS — E55.9 VITAMIN D DEFICIENCY: ICD-10-CM

## 2021-01-14 DIAGNOSIS — I10 ESSENTIAL HYPERTENSION: ICD-10-CM

## 2021-01-14 DIAGNOSIS — E78.5 DYSLIPIDEMIA: ICD-10-CM

## 2021-04-11 DIAGNOSIS — E78.5 DYSLIPIDEMIA: ICD-10-CM

## 2021-04-11 DIAGNOSIS — I10 ESSENTIAL HYPERTENSION: Primary | ICD-10-CM

## 2021-04-11 DIAGNOSIS — E55.9 VITAMIN D DEFICIENCY: ICD-10-CM

## 2021-04-12 NOTE — TELEPHONE ENCOUNTER
Last Visit: 10/7/20 with MD Linda  Next Appointment: pt cancelled appt on 1/7/21  Previous Refill Encounter(s): 10/12/20 #90 with 1 refill    Requested Prescriptions     Pending Prescriptions Disp Refills    atenoloL (TENORMIN) 50 mg tablet 90 Tab 1     Sig: Take 1 Tab by mouth daily.

## 2021-04-13 RX ORDER — ATENOLOL 50 MG/1
50 TABLET ORAL DAILY
Qty: 90 TAB | Refills: 1 | Status: SHIPPED | OUTPATIENT
Start: 2021-04-13 | End: 2021-07-21

## 2021-04-13 NOTE — TELEPHONE ENCOUNTER
appt scheduled. Pt will go to [facility] for labs. Can you please enter orders with a current date.  They won't draw if the orders are over 10 months old

## 2021-04-17 ENCOUNTER — HOSPITAL ENCOUNTER (OUTPATIENT)
Dept: LAB | Age: 56
Discharge: HOME | End: 2021-04-17

## 2021-04-17 LAB — SENTARA SPECIMEN COL,SENBCF: NORMAL

## 2021-04-17 PROCEDURE — 99001 SPECIMEN HANDLING PT-LAB: CPT

## 2021-04-18 LAB
25(OH)D3 SERPL-MCNC: 32.4 NG/ML (ref 32–100)
A-G RATIO,AGRAT: 2.1 RATIO (ref 1.1–2.6)
ALBUMIN SERPL-MCNC: 4.7 G/DL (ref 3.5–5)
ALP SERPL-CCNC: 73 U/L (ref 25–115)
ALT SERPL-CCNC: 49 U/L (ref 5–40)
ANION GAP SERPL CALC-SCNC: 12 MMOL/L (ref 3–15)
AST SERPL W P-5'-P-CCNC: 37 U/L (ref 10–37)
BILIRUB SERPL-MCNC: 0.6 MG/DL (ref 0.2–1.2)
BUN SERPL-MCNC: 25 MG/DL (ref 6–22)
CALCIUM SERPL-MCNC: 9.9 MG/DL (ref 8.4–10.5)
CHLORIDE SERPL-SCNC: 102 MMOL/L (ref 98–110)
CHOLEST SERPL-MCNC: 255 MG/DL (ref 110–200)
CO2 SERPL-SCNC: 25 MMOL/L (ref 20–32)
CREAT SERPL-MCNC: 1.1 MG/DL (ref 0.5–1.2)
GFRAA, 66117: >60
GFRNA, 66118: >60
GLOBULIN,GLOB: 2.2 G/DL (ref 2–4)
GLUCOSE SERPL-MCNC: 136 MG/DL (ref 70–99)
HDLC SERPL-MCNC: 32 MG/DL
HDLC SERPL-MCNC: 8 MG/DL (ref 0–5)
LDL/HDL RATIO,LDHD: 4.5
LDLC SERPL CALC-MCNC: 144 MG/DL (ref 50–99)
NON-HDL CHOLESTEROL, 011976: 223 MG/DL
POTASSIUM SERPL-SCNC: 4.7 MMOL/L (ref 3.5–5.5)
PROT SERPL-MCNC: 6.9 G/DL (ref 6.4–8.3)
SODIUM SERPL-SCNC: 139 MMOL/L (ref 133–145)
TRIGL SERPL-MCNC: 397 MG/DL (ref 40–149)
VLDLC SERPL CALC-MCNC: 79 MG/DL (ref 8–30)

## 2021-07-06 RX ORDER — LOSARTAN POTASSIUM 100 MG/1
TABLET ORAL
Qty: 90 TABLET | Refills: 3 | Status: SHIPPED | OUTPATIENT
Start: 2021-07-06 | End: 2022-04-06

## 2021-07-15 ENCOUNTER — APPOINTMENT (OUTPATIENT)
Dept: INTERNAL MEDICINE CLINIC | Age: 56
End: 2021-07-15

## 2021-07-15 DIAGNOSIS — I10 ESSENTIAL HYPERTENSION: ICD-10-CM

## 2021-07-15 DIAGNOSIS — E55.9 VITAMIN D DEFICIENCY: ICD-10-CM

## 2021-07-15 DIAGNOSIS — E78.5 DYSLIPIDEMIA: ICD-10-CM

## 2021-07-15 DIAGNOSIS — R73.9 ELEVATED BLOOD SUGAR: ICD-10-CM

## 2021-07-16 LAB
25(OH)D3 SERPL-MCNC: 39.3 NG/ML (ref 32–100)
A-G RATIO,AGRAT: 1.9 RATIO (ref 1.1–2.6)
ALBUMIN SERPL-MCNC: 4.6 G/DL (ref 3.5–5)
ALP SERPL-CCNC: 82 U/L (ref 25–115)
ALT SERPL-CCNC: 34 U/L (ref 5–40)
ANION GAP SERPL CALC-SCNC: 16 MMOL/L (ref 3–15)
AST SERPL W P-5'-P-CCNC: 28 U/L (ref 10–37)
AVG GLU, 10930: 130 MG/DL (ref 91–123)
BILIRUB SERPL-MCNC: 0.5 MG/DL (ref 0.2–1.2)
BUN SERPL-MCNC: 29 MG/DL (ref 6–22)
CALCIUM SERPL-MCNC: 9.9 MG/DL (ref 8.4–10.5)
CHLORIDE SERPL-SCNC: 100 MMOL/L (ref 98–110)
CHOLEST SERPL-MCNC: 239 MG/DL (ref 110–200)
CO2 SERPL-SCNC: 25 MMOL/L (ref 20–32)
CREAT SERPL-MCNC: 1.1 MG/DL (ref 0.5–1.2)
GFRAA, 66117: >60
GFRNA, 66118: >60
GLOBULIN,GLOB: 2.4 G/DL (ref 2–4)
GLUCOSE SERPL-MCNC: 105 MG/DL (ref 70–99)
HBA1C MFR BLD HPLC: 6.1 % (ref 4.8–5.6)
HDLC SERPL-MCNC: 30 MG/DL
HDLC SERPL-MCNC: 8 MG/DL (ref 0–5)
LDL, DIRECT,DLDL: 125 MG/DL (ref 50–99)
LDL/HDL RATIO,LDHD: 4.2
LDLC SERPL CALC-MCNC: ABNORMAL MG/DL
NON-HDL CHOLESTEROL, 011976: ABNORMAL
POTASSIUM SERPL-SCNC: 4.3 MMOL/L (ref 3.5–5.5)
PROT SERPL-MCNC: 7 G/DL (ref 6.4–8.3)
SODIUM SERPL-SCNC: 141 MMOL/L (ref 133–145)
TRIGL SERPL-MCNC: 450 MG/DL (ref 40–149)
VLDLC SERPL CALC-MCNC: ABNORMAL MG/DL

## 2021-07-21 ENCOUNTER — OFFICE VISIT (OUTPATIENT)
Dept: ORTHOPEDIC SURGERY | Age: 56
End: 2021-07-21
Payer: COMMERCIAL

## 2021-07-21 VITALS
HEART RATE: 47 BPM | WEIGHT: 275.8 LBS | BODY MASS INDEX: 41.8 KG/M2 | HEIGHT: 68 IN | OXYGEN SATURATION: 98 % | TEMPERATURE: 97.2 F

## 2021-07-21 DIAGNOSIS — M75.51 CHRONIC BURSITIS OF RIGHT SHOULDER: ICD-10-CM

## 2021-07-21 DIAGNOSIS — G89.29 CHRONIC RIGHT SHOULDER PAIN: Primary | ICD-10-CM

## 2021-07-21 DIAGNOSIS — M25.511 CHRONIC RIGHT SHOULDER PAIN: Primary | ICD-10-CM

## 2021-07-21 PROCEDURE — 99213 OFFICE O/P EST LOW 20 MIN: CPT | Performed by: SPECIALIST

## 2021-07-21 PROCEDURE — 20610 DRAIN/INJ JOINT/BURSA W/O US: CPT | Performed by: SPECIALIST

## 2021-07-21 RX ORDER — BETAMETHASONE SODIUM PHOSPHATE AND BETAMETHASONE ACETATE 3; 3 MG/ML; MG/ML
3 INJECTION, SUSPENSION INTRA-ARTICULAR; INTRALESIONAL; INTRAMUSCULAR; SOFT TISSUE ONCE
Status: COMPLETED | OUTPATIENT
Start: 2021-07-21 | End: 2021-07-21

## 2021-07-21 RX ORDER — ATENOLOL 50 MG/1
TABLET ORAL
Qty: 90 TABLET | Refills: 1 | Status: SHIPPED | OUTPATIENT
Start: 2021-07-21 | End: 2022-01-18

## 2021-07-21 RX ADMIN — BETAMETHASONE SODIUM PHOSPHATE AND BETAMETHASONE ACETATE 3 MG: 3; 3 INJECTION, SUSPENSION INTRA-ARTICULAR; INTRALESIONAL; INTRAMUSCULAR; SOFT TISSUE at 09:35

## 2021-07-21 NOTE — PROGRESS NOTES
Patient: Eliott Dandy                MRN: [id number]       SSN: [id number]  YOB: 1965        AGE: 64 y.o. SEX: male    PCP: Gene Villegas MD  07/21/21    CC: RIGHT SHOULDER PAIN    HISTORY:  Eliott Dandy is a 64 y.o. male who is seen for right shoulder pain. He has been experiencing pain for the past on and off for the past three years. He previously responded to a right shoulder injection but his pain has returned while doing yardwork this summer. He believes he has been aggravating his shoulder by cutting grass and using a weed eater. He reports no h/o injury. He notes shoulder pain with overhead activities and at night. He feels a shooting pain when reaching behind his  back. He feels increased pain in his [facility] joint with adduction and forward elevation. He is right handed. He was previously seen for right thumb pain. He is s/p a right CTR by Dr. Jad Olivera on 8-12-15 - doing well. He is s/p successful right trigger thumb release on 8/26/18. He has a h/o achilles tendonitis. 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 He does not have any children. His wife had head, neck and throat cancer. He does not play any sports. He walks for exercise. He gained weight due to the Coronavirus shutdown but he recently lost ~30 pounds. .  Current weight is 275 pounds. He is 5'8\" tall.      Lab Results   Component Value Date/Time    Hemoglobin A1c 6.1 (H) 07/15/2021 12:34 PM     Weight Metrics 7/21/2021 4/22/2021 10/7/2020 9/16/2020 6/28/2019 12/26/2018 8/27/2018   Weight 275 lb 12.8 oz 297 lb 292 lb 296 lb 3.2 oz 278 lb 261 lb 9.6 oz 250 lb   BMI 41.94 kg/m2 45.16 kg/m2 44.4 kg/m2 45.04 kg/m2 42.27 kg/m2 39.78 kg/m2 38.01 kg/m2       Patient Active Problem List   Diagnosis Code    Hypertension I10    VENTRAL HERNIA     High cholesterol E78.00    High triglycerides E78.1    Obesity E66.9    Bilateral carpal tunnel syndrome G56.03    Vitamin D deficiency E55.9    Dyslipidemia E78.5    Severe obesity (BMI 35.0-39. 9) E66.01     REVIEW OF SYSTEMS: All Below are Negative except: See HPI   Constitutional: negative for fever, chills, and weight loss. Cardiovascular: negative for chest pain, claudication, leg swelling, SOB, FROST   Gastrointestinal: Negative for pain, N/V/C/D, Blood in stool or urine, dysuria,  hematuria, incontinence, pelvic pain. Musculoskeletal: See HPI   Neurological: Negative for dizziness and weakness. Negative for headaches, Visual changes, confusion, seizures   Phychiatric/Behavioral: Negative for depression, memory loss, substance  abuse. Extremities: Negative for hair changes, rash, or skin lesion changes. Hematologic: Negative for bleeding problems, bruising, pallor or swollen lymph  nodes   Peripheral Vascular: No calf pain, no circulation deficits.     Social History     Socioeconomic History    Marital status:      Spouse name: Not on file    Number of children: Not on file    Years of education: Not on file    Highest education level: Not on file   Occupational History    Not on file   Tobacco Use    Smoking status: Never Smoker    Smokeless tobacco: Never Used   Substance and Sexual Activity    Alcohol use: No    Drug use: No    Sexual activity: Yes     Partners: Female   Other Topics Concern    Not on file   Social History Narrative    Not on file     Social Determinants of Health     Financial Resource Strain:     Difficulty of Paying Living Expenses:    Food Insecurity:     Worried About [address] [facility] in the Last Year:     [address] in the Last Year:    Transportation Needs:     Lack of Transportation (Medical):  Lack of Transportation (Non-Medical):    Physical Activity:     Days of Exercise per Week:     Minutes of Exercise per Session:    Stress:     Feeling of Stress :    Social Connections:     Frequency of Communication with Friends and Family:     Frequency of Social Gatherings with Friends and Family:     Attends Bahai Services:     Active Member of Clubs or Organizations:     Attends Club or Organization Meetings:     Marital Status:    Intimate Partner Violence:     Fear of Current or Ex-Partner:     Emotionally Abused:     Physically Abused:     Sexually Abused: Allergies   Allergen Reactions    Statins-Hmg-Coa Reductase Inhibitors Myalgia    Tricor [Fenofibrate Micronized] Myalgia      Current Outpatient Medications   Medication Sig    atenoloL (TENORMIN) 50 mg tablet TAKE 1 TABLET BY MOUTH DAILY.  losartan (COZAAR) 100 mg tablet TAKE 1 TABLET BY MOUTH EVERY DAY    ergocalciferol, vitamin D2, (VITAMIN D2 PO) Take  by mouth.  docosahexaenoic acid/epa (FISH OIL PO) Take  by mouth.  multivitamin (ONE A DAY) tablet Take 1 Tab by mouth daily.  rosuvastatin (CRESTOR) 5 mg tablet Take 1 Tab by mouth daily.  fluticasone propionate (FLONASE) 50 mcg/actuation nasal spray 2 Sprays by Both Nostrils route daily. (Patient not taking: Reported on 7/21/2021)     No current facility-administered medications for this visit.       PHYSICAL EXAMINATION:  Visit Vitals  Pulse (!) 47   Temp 97.2 °F (36.2 °C) (Skin)   Ht 5' 8\" (1.727 m)   Wt 275 lb 12.8 oz (125.1 kg)   SpO2 98%   BMI 41.94 kg/m²   ORTHO EXAMINATION:  Examination Right shoulder Left shoulder   Skin Intact Intact   Effusion - -   Biceps deformity - -   Atrophy - -   AC joint tenderness - -   Acromial tenderness + posterior +   Biceps tenderness + -   Forward flexion/Elevation  170   Active abduction  170   External rotation ROM 60 50   Internal rotation ROM 65 90   Apprehension - -   Impingement - -   Drop Arm Test - -   Neurovascular Intact Intact       Chart reviewed for the following:   IDimitry MD, have reviewed the History, Physical and updated the Allergic reactions for [address] performed immediately prior to start of procedure:  Liseth Haney MD, have performed the following reviews on Ismael Verma prior to the start of the procedure:            * Patient was identified by name and date of birth   * Agreement on procedure being performed was verified  * Risks and Benefits explained to the patient  * Procedure site verified and marked as necessary  * Patient was positioned for comfort  * Consent was obtained     Time: 9:27 AM     Date of procedure: 7/21/2021    Procedure performed by:  Dimitry Card MD    Mr. Elias Wakefield tolerated the procedure well with no complications. RADIOGRAPHS:  XR RIGHT SHOULDER 9/16/20 ESTRELLA  IMPRESSION:  Three views - No fractures, mild acromioclavicular narrowing, no glenohumeral narrowing, no calcific densities. IMPRESSION:      ICD-10-CM ICD-9-CM    1. Chronic right shoulder pain  M25.511 719.41 betamethasone (CELESTONE) injection 3 mg    G89.29 338.29 DRAIN/INJECT LARGE JOINT/BURSA   2. Chronic bursitis of right shoulder  M75.51 726.10 betamethasone (CELESTONE) injection 3 mg      DRAIN/INJECT LARGE JOINT/BURSA     PLAN:  Dietary counseling provided today. Start weight loss with low carb diet and intermittent fasting. After discussing treatment options, patient's right shoulder was injected with 4 cc Marcaine and 1/2 cc Celestone. We discussed a possible need for a right shoulder MRI in the future if pain continues. He will follow up as needed.      Scribed by Mills Cockayne  ([address]) as dictated by Dimitry Card MD

## 2021-07-26 ENCOUNTER — OFFICE VISIT (OUTPATIENT)
Dept: INTERNAL MEDICINE CLINIC | Age: 56
End: 2021-07-26
Payer: COMMERCIAL

## 2021-07-26 VITALS
WEIGHT: 268 LBS | HEART RATE: 63 BPM | RESPIRATION RATE: 15 BRPM | SYSTOLIC BLOOD PRESSURE: 103 MMHG | OXYGEN SATURATION: 98 % | HEIGHT: 68 IN | DIASTOLIC BLOOD PRESSURE: 62 MMHG | BODY MASS INDEX: 40.62 KG/M2 | TEMPERATURE: 97.4 F

## 2021-07-26 DIAGNOSIS — E55.9 VITAMIN D DEFICIENCY: ICD-10-CM

## 2021-07-26 DIAGNOSIS — E66.01 MORBID OBESITY (HCC): ICD-10-CM

## 2021-07-26 DIAGNOSIS — I10 ESSENTIAL HYPERTENSION: Primary | ICD-10-CM

## 2021-07-26 DIAGNOSIS — R73.03 PREDIABETES: ICD-10-CM

## 2021-07-26 DIAGNOSIS — E78.5 DYSLIPIDEMIA: ICD-10-CM

## 2021-07-26 PROCEDURE — 99214 OFFICE O/P EST MOD 30 MIN: CPT | Performed by: INTERNAL MEDICINE

## 2021-07-26 NOTE — PROGRESS NOTES
Pt is here for   Chief Complaint   Patient presents with    Cholesterol Problem     follow up    Hypertension       1. Have you been to the ER, urgent care clinic or hospitalized since your last visit? NO.     2. Have you seen or consulted any other health care providers outside of the [address] [place] [address] since your last visit (Include any pap smears or colon screening)? NO      Do you have an Advanced Directive? NO    Would you like information on Advanced Directives?  NO

## 2021-07-26 NOTE — PROGRESS NOTES
Subjective:       Chief Complaint  The patient presents for follow up of hypertension and high cholesterol. Obesity, vit D deficiency         HPI  Madiha Varela is a 64 y.o. male seen for follow up of hyperlipidemia. Raj has hypertension. Hypertension well controlled, on Tenormin 50 mg and Cozaar 100 mg. Patient has lost about 30 pounds over the last 3 months., hyperlipidemia poorly controlled  pt unable to tolerate statins. He tried Crestor but did not take it with co-Q10. Patient's going to try again to work on losing more weight to improve further. Alicia Lagunas His BMI is down to 40 but he still needs to lose much more weight to improve his general health. He did a Heart Scan and his CAC score is 120    Diet and Lifestyle: generally follows a low fat low cholesterol diet, exercises sporadically    Home BP Monitoring: is not measured at home. Other symptoms and concerns: Pt's vit D level is well controlled on current supplementation. Patient to take vitamin D 2000 units per day on a regular basis. Patient also has some mild prediabetes which he will try and improve by cutting back starches and sweets and continuing to lose weight. Current Outpatient Medications   Medication Sig    atenoloL (TENORMIN) 50 mg tablet TAKE 1 TABLET BY MOUTH DAILY.  losartan (COZAAR) 100 mg tablet TAKE 1 TABLET BY MOUTH EVERY DAY    ergocalciferol, vitamin D2, (VITAMIN D2 PO) Take  by mouth.  docosahexaenoic acid/epa (FISH OIL PO) Take  by mouth.  multivitamin (ONE A DAY) tablet Take 1 Tab by mouth daily.  rosuvastatin (CRESTOR) 5 mg tablet Take 1 Tab by mouth daily.  fluticasone propionate (FLONASE) 50 mcg/actuation nasal spray 2 Sprays by Both Nostrils route daily. (Patient not taking: Reported on 7/21/2021)     No current facility-administered medications for this visit.              Review of Systems  Respiratory: negative for dyspnea on exertion  Cardiovascular: negative for chest pain    Objective: Visit Vitals  /62 (BP 1 Location: Right arm, BP Patient Position: Sitting, BP Cuff Size: Large adult)   Pulse 63   Temp 97.4 °F (36.3 °C) (Temporal)   Resp 15   Ht 5' 8\" (1.727 m)   Wt 268 lb (121.6 kg)   SpO2 98%   BMI 40.75 kg/m²        General appearance - alert, well appearing, and in no distress  Neck - supple, no significant adenopathy, carotids upstroke normal bilaterally, no bruits  Chest - clear to auscultation, no wheezes, rales or rhonchi, symmetric air entry  Heart - normal rate, regular rhythm, normal S1, S2, no murmurs, rubs, clicks or gallops  Extremities - peripheral pulses normal, no pedal edema, no clubbing or cyanosis  Skin - normal coloration and turgor, no rashes, no suspicious skin lesions noted      Labs:      Labs:   Lab Results   Component Value Date/Time    Cholesterol, total 239 (H) 07/15/2021 12:34 PM    HDL Cholesterol 30 (L) 07/15/2021 12:34 PM    LDL,Direct 125 (H) 07/15/2021 12:34 PM    LDL, calculated  07/15/2021 12:34 PM      Comment:      Triglyceride value is too high to calculate LDL. Enriqueta Carvajal Direct LDL is being  performed. Triglyceride 450 (H) 07/15/2021 12:34 PM     Lab Results   Component Value Date/Time    Sodium 141 07/15/2021 12:34 PM    Potassium 4.3 07/15/2021 12:34 PM    Chloride 100 07/15/2021 12:34 PM    CO2 25 07/15/2021 12:34 PM    Anion gap 16.0 (H) 07/15/2021 12:34 PM    Glucose 105 (H) 07/15/2021 12:34 PM    BUN 29 (H) 07/15/2021 12:34 PM    Creatinine 1.1 07/15/2021 12:34 PM    BUN/Creatinine ratio 18 06/29/2015 07:51 AM    GFR est  06/29/2015 07:51 AM    GFR est non- 06/29/2015 07:51 AM    Calcium 9.9 07/15/2021 12:34 PM    Bilirubin, total 0.5 07/15/2021 12:34 PM    ALT (SGPT) 34 07/15/2021 12:34 PM    Alk.  phosphatase 82 07/15/2021 12:34 PM    Protein, total 7.0 07/15/2021 12:34 PM    Albumin 4.6 07/15/2021 12:34 PM    Globulin 2.4 07/15/2021 12:34 PM    A-G Ratio 1.9 07/15/2021 12:34 PM      Lab Results   Component Value Date/Time Hemoglobin A1c 6.1 (H) 07/15/2021 12:34 PM              Assessment / Plan     Hypertension well controlled on Tenormin 50 mg daily and cozaar 100 mg daily   Hyperlipidemia poorly controlled. Patient will try 1 more time to take Crestor 5 mg 2 times a week with co-Q10. If unable to tolerate we will continue just to work on diet and weight loss. Patient had a coronary artery calcium score elevated at 120 in the past.       ICD-10-CM ICD-9-CM    1. Essential hypertension  E50 169.6 METABOLIC PANEL, COMPREHENSIVE   2. Dyslipidemia  E78.5 272.4 LIPID PANEL   3. Prediabetes  R73.03 790.29  patient will try and control with continued diet and weight loss HEMOGLOBIN A1C W/O EAG   4. Vitamin D deficiency  E55.9 268.9  well-controlled vitamin D 2000's per day VITAMIN D, 25 HYDROXY   5. Morbid obesity ([facility] [address]  E66.01 278.01  patient will continue to work on trying to lose weight by cutting back starches and sweets in his diet. Low cholesterol diet, weight control and daily exercise discussed. Follow-up and Dispositions    · Return in about 4 months (around 11/26/2021) for labs 1 week before. Reviewed plan of care. Patient has provided input and agrees with goals.

## 2021-10-18 LAB — COLONOSCOPY, EXTERNAL: NORMAL

## 2021-11-13 ENCOUNTER — HOSPITAL ENCOUNTER (OUTPATIENT)
Dept: LAB | Age: 56
Discharge: HOME | End: 2021-11-13

## 2021-11-13 LAB — SENTARA SPECIMEN COL,SENBCF: NORMAL

## 2021-11-13 PROCEDURE — 99001 SPECIMEN HANDLING PT-LAB: CPT

## 2021-11-14 LAB
25(OH)D3 SERPL-MCNC: 27.4 NG/ML (ref 32–100)
A-G RATIO,AGRAT: 2.3 RATIO (ref 1.1–2.6)
ALBUMIN SERPL-MCNC: 4.3 G/DL (ref 3.5–5)
ALP SERPL-CCNC: 81 U/L (ref 25–115)
ALT SERPL-CCNC: 26 U/L (ref 5–40)
ANION GAP SERPL CALC-SCNC: 12 MMOL/L (ref 3–15)
AST SERPL W P-5'-P-CCNC: 20 U/L (ref 10–37)
AVG GLU, 10930: 129 MG/DL (ref 91–123)
BILIRUB SERPL-MCNC: 0.3 MG/DL (ref 0.2–1.2)
BUN SERPL-MCNC: 11 MG/DL (ref 6–22)
CALCIUM SERPL-MCNC: 9.3 MG/DL (ref 8.4–10.5)
CHLORIDE SERPL-SCNC: 105 MMOL/L (ref 98–110)
CO2 SERPL-SCNC: 23 MMOL/L (ref 20–32)
CREAT SERPL-MCNC: 0.9 MG/DL (ref 0.5–1.2)
GFRAA, 66117: >60
GFRNA, 66118: >60
GLOBULIN,GLOB: 1.9 G/DL (ref 2–4)
GLUCOSE SERPL-MCNC: 122 MG/DL (ref 70–99)
HBA1C MFR BLD HPLC: 6.1 % (ref 4.8–5.6)
POTASSIUM SERPL-SCNC: 4.4 MMOL/L (ref 3.5–5.5)
PROT SERPL-MCNC: 6.2 G/DL (ref 6.4–8.3)
SODIUM SERPL-SCNC: 140 MMOL/L (ref 133–145)

## 2021-11-16 LAB — LDL, DIRECT,DLDL: 112 MG/DL (ref 50–99)

## 2021-11-17 LAB
CHOLEST SERPL-MCNC: 232 MG/DL (ref 110–200)
HDLC SERPL-MCNC: 28 MG/DL
HDLC SERPL-MCNC: 8.3 MG/DL (ref 0–5)
LDL/HDL RATIO,LDHD: 4
LDLC SERPL CALC-MCNC: ABNORMAL MG/DL
NON-HDL CHOLESTEROL, 011976: ABNORMAL
TRIGL SERPL-MCNC: 480 MG/DL (ref 40–149)
VLDLC SERPL CALC-MCNC: ABNORMAL MG/DL

## 2021-11-22 ENCOUNTER — OFFICE VISIT (OUTPATIENT)
Dept: INTERNAL MEDICINE CLINIC | Age: 56
End: 2021-11-22
Payer: COMMERCIAL

## 2021-11-22 VITALS
DIASTOLIC BLOOD PRESSURE: 71 MMHG | HEIGHT: 68 IN | WEIGHT: 267 LBS | TEMPERATURE: 97.6 F | RESPIRATION RATE: 18 BRPM | OXYGEN SATURATION: 95 % | HEART RATE: 66 BPM | BODY MASS INDEX: 40.47 KG/M2 | SYSTOLIC BLOOD PRESSURE: 128 MMHG

## 2021-11-22 DIAGNOSIS — M79.672 PAIN OF LEFT HEEL: ICD-10-CM

## 2021-11-22 DIAGNOSIS — Z12.5 PROSTATE CANCER SCREENING: ICD-10-CM

## 2021-11-22 DIAGNOSIS — I10 ESSENTIAL HYPERTENSION: Primary | ICD-10-CM

## 2021-11-22 DIAGNOSIS — M10.9 ACUTE GOUT INVOLVING TOE OF LEFT FOOT, UNSPECIFIED CAUSE: ICD-10-CM

## 2021-11-22 DIAGNOSIS — E55.9 VITAMIN D DEFICIENCY: ICD-10-CM

## 2021-11-22 DIAGNOSIS — E78.5 DYSLIPIDEMIA: ICD-10-CM

## 2021-11-22 DIAGNOSIS — R73.03 PREDIABETES: ICD-10-CM

## 2021-11-22 DIAGNOSIS — E66.01 MORBID OBESITY (HCC): ICD-10-CM

## 2021-11-22 PROCEDURE — 99214 OFFICE O/P EST MOD 30 MIN: CPT | Performed by: INTERNAL MEDICINE

## 2021-11-22 RX ORDER — AA/PROT/LYSINE/METHIO/VIT C/B6 50-12.5 MG
TABLET ORAL
COMMUNITY
End: 2022-07-20

## 2021-11-22 RX ORDER — ROSUVASTATIN CALCIUM 5 MG/1
5 TABLET, COATED ORAL DAILY
Qty: 30 TABLET | Refills: 5 | Status: SHIPPED | OUTPATIENT
Start: 2021-11-22 | End: 2022-07-20 | Stop reason: SINTOL

## 2021-11-22 RX ORDER — VITAMIN E 1000 UNIT
CAPSULE ORAL
COMMUNITY
End: 2022-07-20

## 2021-11-22 RX ORDER — COLCHICINE 0.6 MG/1
0.6 CAPSULE ORAL 2 TIMES DAILY
Qty: 60 CAPSULE | Refills: 5 | Status: SHIPPED | OUTPATIENT
Start: 2021-11-22 | End: 2022-07-20

## 2021-11-22 NOTE — PROGRESS NOTES
Subjective:       Chief Complaint  The patient presents for follow up of hypertension and high cholesterol. Obesity, vit D deficiency         HPI  Janneth Navarro is a 64 y.o. male seen for follow up of hyperlipidemia. Raj has hypertension. Hypertension well controlled, on Tenormin 50 mg and Cozaar 100 mg. Patient continues to struggle to lose weight and keep it off. hyperlipidemia poorly controlled patient has had difficulty tolerating statins in the past but he is tolerating Crestor 5 mg 3 times a week with taking co-Q10, patient is also able to tolerate omega-3 fatty acids, patient's going to try again to work on losing more weight to improve further. [place] His BMI is down to 40 but he still needs to lose much more weight to improve his general health. He did a Heart Scan 10/2020 and his CAC score is 120. Diet and Lifestyle: generally follows a low fat low cholesterol diet, exercises sporadically    Home BP Monitoring: is not measured at home. Other symptoms and concerns: Pt's vit D level is not well controlled on vit D 1000 units/day. Patient to take vitamin D 2000 units per day on a regular basis. Patient also has some mild prediabetes which he will try and improve by cutting back starches and sweets and continuing to lose weight. Patient is having left heel pain for which we will refer to orthopedics for further evaluation. He also has had some mild exacerbation of gout in his left toe and will treat with colchicine. Current Outpatient Medications   Medication Sig    ascorbic acid, vitamin C, (Vitamin C) 1,000 mg tablet Take  by mouth.  coenzyme q10 (Co Q-10) 10 mg cap Take  by mouth.  rosuvastatin (CRESTOR) 5 mg tablet Take 1 Tablet by mouth daily.  colchicine (Mitigare) 0.6 mg capsule Take 1 Capsule by mouth two (2) times a day.  atenoloL (TENORMIN) 50 mg tablet TAKE 1 TABLET BY MOUTH DAILY.     losartan (COZAAR) 100 mg tablet TAKE 1 TABLET BY MOUTH EVERY DAY    ergocalciferol, vitamin D2, (VITAMIN D2 PO) Take  by mouth.  docosahexaenoic acid/epa (FISH OIL PO) Take  by mouth.  multivitamin (ONE A DAY) tablet Take 1 Tab by mouth daily.  fluticasone propionate (FLONASE) 50 mcg/actuation nasal spray 2 Sprays by Both Nostrils route daily. (Patient not taking: Reported on 7/21/2021)     No current facility-administered medications for this visit. Review of Systems  Respiratory: negative for dyspnea on exertion  Cardiovascular: negative for chest pain    Objective:     Visit Vitals  /71 (BP 1 Location: Left arm, BP Patient Position: Sitting, BP Cuff Size: Adult)   Pulse 66   Temp 97.6 °F (36.4 °C) (Temporal)   Resp 18   Ht 5' 8\" (1.727 m)   Wt 267 lb (121.1 kg)   SpO2 95%   BMI 40.60 kg/m²        General appearance - alert, well appearing, and in no distress  Neck - supple, no significant adenopathy, carotids upstroke normal bilaterally, no bruits  Chest - clear to auscultation, no wheezes, rales or rhonchi, symmetric air entry  Heart - normal rate, regular rhythm, normal S1, S2, no murmurs, rubs, clicks or gallops  Extremities - peripheral pulses normal, no pedal edema, no clubbing or cyanosis  Skin - normal coloration and turgor, no rashes, no suspicious skin lesions noted      Labs:      Labs:   Lab Results   Component Value Date/Time    Cholesterol, total 232 (H) 11/13/2021 07:15 AM    HDL Cholesterol 28 (L) 11/13/2021 07:15 AM    LDL,Direct 112 (H) 11/13/2021 07:15 AM    LDL, calculated  11/13/2021 07:15 AM      Comment:      Triglyceride value is too high to calculate LDL. Victor Hugo Ruano Direct LDL is being  performed.         Triglyceride 480 (H) 11/13/2021 07:15 AM     Lab Results   Component Value Date/Time    Sodium 140 11/13/2021 07:15 AM    Potassium 4.4 11/13/2021 07:15 AM    Chloride 105 11/13/2021 07:15 AM    CO2 23 11/13/2021 07:15 AM    Anion gap 12.0 11/13/2021 07:15 AM    Glucose 122 (H) 11/13/2021 07:15 AM    BUN 11 11/13/2021 07:15 AM Creatinine 0.9 11/13/2021 07:15 AM    BUN/Creatinine ratio 18 06/29/2015 07:51 AM    GFR est  06/29/2015 07:51 AM    GFR est non- 06/29/2015 07:51 AM    Calcium 9.3 11/13/2021 07:15 AM    Bilirubin, total 0.3 11/13/2021 07:15 AM    ALT (SGPT) 26 11/13/2021 07:15 AM    Alk. phosphatase 81 11/13/2021 07:15 AM    Protein, total 6.2 (L) 11/13/2021 07:15 AM    Albumin 4.3 11/13/2021 07:15 AM    Globulin 1.9 (L) 11/13/2021 07:15 AM    A-G Ratio 2.3 11/13/2021 07:15 AM      Lab Results   Component Value Date/Time    Hemoglobin A1c 6.1 (H) 11/13/2021 07:15 AM        Labs:   Lab Results   Component Value Date/Time    Prostate Specific Ag 0.520 09/18/2020 09:10 AM    Prostate Specific Ag 0.601 02/27/2017 04:22 PM           Assessment / Plan     Hypertension well controlled on Tenormin 50 mg daily and cozaar 100 mg daily   Hyperlipidemia has been difficult to control due to patient's intolerance of statins. He is now tolerating Crestor 5 mg 3 times a week with co-Q10 and omega-3 fatty acids. He will continue to work on trying to improve diet and losing weight. .  Patient had a coronary artery calcium score elevated at 120 in the past.        ICD-10-CM ICD-9-CM    1. Essential hypertension  N36 006.2 METABOLIC PANEL, COMPREHENSIVE      MICROALBUMIN, UR, RAND W/ MICROALB/CREAT RATIO   2. Dyslipidemia  E78.5 272.4 LIPID PANEL   3. Prediabetes  R73.03 790.29  patient return improved with diet and weight loss HEMOGLOBIN A1C W/O EAG   4. Morbid obesity ([address]  E66.01 278.01  patient will continue to work on trying to improve diet by cutting back starches and sweets. 5. Vitamin D deficiency  E55.9 268.9  uncontrolled patient to increase to vitamin D 2000 units/day VITAMIN D, 25 HYDROXY   6. Pain of left heel  M79.672 729.5 REFERRAL TO ORTHOPEDICS   7. Acute gout involving toe of left foot, unspecified cause  M10.9 274.01  will treat with colchicine   8.  Prostate cancer screening  Z12.5 V76.44 PSA, DIAGNOSTIC (PROSTATE SPECIFIC AG)             Low cholesterol diet, weight control and daily exercise discussed. Follow-up and Dispositions    · Return in about 4 months (around 3/22/2022) for labs 1 week before. Reviewed plan of care. Patient has provided input and agrees with goals.

## 2021-11-22 NOTE — PATIENT INSTRUCTIONS
High Blood Pressure: Care Instructions  Overview     It's normal for blood pressure to go up and down throughout the day. But if it stays up, you have high blood pressure. Another name for high blood pressure is hypertension. Despite what a lot of people think, high blood pressure usually doesn't cause headaches or make you feel dizzy or lightheaded. It usually has no symptoms. But it does increase your risk of stroke, heart attack, and other problems. You and your doctor will talk about your risks of these problems based on your blood pressure. Your doctor will give you a goal for your blood pressure. Your goal will be based on your health and your age. Lifestyle changes, such as eating healthy and being active, are always important to help lower blood pressure. You might also take medicine to reach your blood pressure goal.  Follow-up care is a key part of your treatment and safety. Be sure to make and go to all appointments, and call your doctor if you are having problems. It's also a good idea to know your test results and keep a list of the medicines you take. How can you care for yourself at home? Medical treatment  · If you stop taking your medicine, your blood pressure will go back up. You may take one or more types of medicine to lower your blood pressure. Be safe with medicines. Take your medicine exactly as prescribed. Call your doctor if you think you are having a problem with your medicine. · Talk to your doctor before you start taking aspirin every day. Aspirin can help certain people lower their risk of a heart attack or stroke. But taking aspirin isn't right for everyone, because it can cause serious bleeding. · See your doctor regularly. You may need to see the doctor more often at first or until your blood pressure comes down. · If you are taking blood pressure medicine, talk to your doctor before you take decongestants or anti-inflammatory medicine, such as ibuprofen.  Some of these medicines can raise blood pressure. · Learn how to check your blood pressure at home. Lifestyle changes  · Stay at a healthy weight. This is especially important if you put on weight around the waist. Losing even 10 pounds can help you lower your blood pressure. · If your doctor recommends it, get more exercise. Walking is a good choice. Bit by bit, increase the amount you walk every day. Try for at least 30 minutes on most days of the week. You also may want to swim, bike, or do other activities. · Avoid or limit alcohol. Talk to your doctor about whether you can drink any alcohol. · Try to limit how much sodium you eat to less than 2,300 milligrams (mg) a day. Your doctor may ask you to try to eat less than 1,500 mg a day. · Eat plenty of fruits (such as bananas and oranges), vegetables, legumes, whole grains, and low-fat dairy products. · Lower the amount of saturated fat in your diet. Saturated fat is found in animal products such as milk, cheese, and meat. Limiting these foods may help you lose weight and also lower your risk for heart disease. · Do not smoke. Smoking increases your risk for heart attack and stroke. If you need help quitting, talk to your doctor about stop-smoking programs and medicines. These can increase your chances of quitting for good. When should you call for help? Call 911  anytime you think you may need emergency care. This may mean having symptoms that suggest that your blood pressure is causing a serious heart or blood vessel problem. Your blood pressure may be over 180/120. For example, call 911 if:    · You have symptoms of a heart attack. These may include:  ? Chest pain or pressure, or a strange feeling in the chest.  ? Sweating. ? Shortness of breath. ? Nausea or vomiting. ? Pain, pressure, or a strange feeling in the back, neck, jaw, or upper belly or in one or both shoulders or arms. ? Lightheadedness or sudden weakness.   ? A fast or irregular heartbeat.     · You have symptoms of a stroke. These may include:  ? Sudden numbness, tingling, weakness, or loss of movement in your face, arm, or leg, especially on only one side of your body. ? Sudden vision changes. ? Sudden trouble speaking. ? Sudden confusion or trouble understanding simple statements. ? Sudden problems with walking or balance. ? A sudden, severe headache that is different from past headaches.     · You have severe back or belly pain. Do not wait until your blood pressure comes down on its own. Get help right away. Call your doctor now or seek immediate care if:    · Your blood pressure is much higher than normal (such as 180/120 or higher), but you don't have symptoms.     · You think high blood pressure is causing symptoms, such as:  ? Severe headache.  ? Blurry vision. Watch closely for changes in your health, and be sure to contact your doctor if:    · Your blood pressure measures higher than your doctor recommends at least 2 times. That means the top number is higher or the bottom number is higher, or both.     · You think you may be having side effects from your blood pressure medicine. Where can you learn more? Go to http://www.gray.com/  Enter [id number] in the search box to learn more about \"High Blood Pressure: Care Instructions. \"  Current as of: April 29, 2021               Content Version: 13.0  © 2006-2021 [employer]. Care instructions adapted under license by [employer] (which disclaims liability or warranty for this information). If you have questions about a medical condition or this instruction, always ask your healthcare professional. [place] [address] any warranty or liability for your use of this information.

## 2021-11-22 NOTE — PROGRESS NOTES
Patient is in the office today for a 4 month follow up. 1. \"Have you been to the ER, urgent care clinic since your last visit? Hospitalized since your last visit? \" No    2. \"Have you seen or consulted any other health care providers outside of the [address] [place] [address] since your last visit? \" No     3. For patients aged 39-70: Has the patient had a colonoscopy? No     If the patient is female:    4. For patients aged 41-77: Has the patient had a mammogram within the past 2 years? NA based on age or sex    11. For patients aged 21-65: Has the patient had a pap smear?  NA based on age or sex

## 2021-11-25 DIAGNOSIS — E78.5 DYSLIPIDEMIA: ICD-10-CM

## 2021-11-25 DIAGNOSIS — E55.9 VITAMIN D DEFICIENCY: ICD-10-CM

## 2021-11-25 DIAGNOSIS — I10 ESSENTIAL HYPERTENSION: ICD-10-CM

## 2021-12-13 NOTE — PROGRESS NOTES
AMBULATORY PROGRESS NOTE      Patient: Víctor Gómez             MRN: [id number]     SSN: [id number] Body mass index is 41.69 kg/m². YOB: 1965     AGE: 64 y.o. EX: male    PCP: Quinton Martinez MD       IMPRESSION //  DIAGNOSIS AND TREATMENT PLAN        Víctor Gómez has a diagnosis of: Víctor Gómez has insertional Achilles tendinitis, left hindfoot, with some calcific deposits. Over the last 9 months, since March 2021, he has had intermittent disabling pain to his left hindfoot. Despite anti-inflammatories, active modification, Ace wrap's, and shoewear changes, he continues to have daily pain to left hindfoot long Achilles tendon. Clinical evaluation, reveals he has a fullness, to the posterior lateral portion of his left calcaneus and posterior medial portion left calcaneus, a firm fullness with some slight fullness, possible ganglion cyst along the Achilles tendon, but in any event has reproducible point tenderness to left Achilles tendon insertional point, and walks with a limp. His x-rays, 3 views, left foot today, AP, lateral oblique images, today, show a calcific bone spur at the insertion point of this left, Achilles tendon, associated soft tissue swelling. Otherwise I see no osteolytic or osteoblastic lesions on these x-rays. Recommendations, MRI to provide a volumetric assessment of this left Achilles tendon, MRI left ankle: This is outside of medical necessity, as the scope conservative treatment, as listed above in my second paragraph of this report. DIAGNOSES    1. Achilles tendinitis of left lower extremity    2.  Left foot pain        Orders Placed This Encounter    AMB POC XRAY, FOOT; COMPLETE, 3+ VIEW     ASK ALL FEMALE PATIENTS IF PREGNANT     Order Specific Question:   Reason for Exam     Answer:   PAIN    MRI ANKLE LT WO CONT     Standing Status:   Future     Standing Expiration Date:   1/14/2022     Order Specific Question: Arthrogram study     Answer:   No     Order Specific Question:   Reason for Exam     Answer:   assess distal achiles tendon    meloxicam (MOBIC) 15 mg tablet     Sig: Take 1 Tablet by mouth daily. Dispense:  30 Tablet     Refill:  1    famotidine (PEPCID) 40 mg tablet     Sig: Take 1 Tablet by mouth daily. Dispense:  30 Tablet     Refill:  1            PLAN:    1. Obtain 3-View XR of  Left ankle   2. Order MRI of left ankle :  Distal achilles tendon   3. Rx of Mobic/Pepcid    RTO: after MRI    There are no Patient Instructions on file for this visit. Please follow up with your PCP for any health maintenance as recommended         Rubén Plasencia  expresses understanding of the diagnosis, treatment plan, and all of their proposed questions were answered to their satisfaction. Patient education has been provided re the diagnoses. HPI //  Alex Pedro IS A 64 y.o. male who is a/an  new patient, presenting to my outpatient office for evaluation of  the following chief complaint(s):     Chief Complaint   Patient presents with    Foot Pain     left, oneil Plasencia presents today w/ intermittent left foot pain. He noticed swelling along the back of his heel in March 2021. He notes start up pain, but the pain tends to decrease the more he is on it. However, he mentions sometimes the more he is on his left foot the more it hurts. He mentions he went to a Podiatrist at [facility], and ordered him orthotics , which didn't provide relief. H/O gout. 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 Visit Vitals  Pulse 76   Temp 97.1 °F (36.2 °C) (Temporal)   Wt 274 lb 3.2 oz (124.4 kg)   SpO2 98%   BMI 41.69 kg/m²       Appearance: Alert, well appearing and pleasant patient who is in no distress, oriented to person, place/time, and who follows commands. Normal dress/motor activity/thought processes/memory.    This patient is accompanied in the examination room by his  self. Patient arrives to office via: without assistive device:     Psychiatric:  Normal Affect/mood. Judgement, behavior, and conduct are appropriate. Speech normal in context and clarity, memory intact grossly, no involuntary movements - tremors. H EENT (2): Head normocephalic & atraumatic. Eye: pupils are round// EOM are intact // Neck: ROM WNL  // Hearings Intact   Respiratory: Breathing non labored     ANKLE/FOOT left    Gait: slow  Tenderness: moderate over  posteromedial ankle  Cutaneous:  posteromedial swelling & posterior tibia swelling ; redness to his left Great Toe  Joint Motion:   WNL   Joint / Tendon Stability:  No Ankle or Subtalar instability or joint laxity. No peroneal sublux ability or dislocation  Alignment: neutral Hindfoot,    Neuro Motor/Sensory: NL/NL  Vascular: NL foot/ankle pulses,   Lymphatics: No extremity lymphedema, No calf swelling, no tenderness to calf muscles. CHART REVIEW     Daniel Hill has been experiencing pain and discomfort confirmed as outlined in the pain assessment outlined below.  was reviewed by Raghav Darby MD on 12/14/2021. Pain Assessment  12/14/2021   Location of Pain Foot   Location Modifiers Left   Severity of Pain 2   Quality of Pain Aching   Quality of Pain Comment -   Duration of Pain A few minutes   Frequency of Pain Intermittent   Date Pain First Started -   Aggravating Factors Stairs; Walking;Standing;Squatting;Kneeling   Aggravating Factors Comment -   Limiting Behavior Yes   Relieving Factors Rest;Elevation   Relieving Factors Comment compression   Result of Injury -        Daniel Hill  has a past medical history of High cholesterol, Hypertension, and Right shoulder pain.      Patients is employed at:         Past Medical History:   Diagnosis Date    High cholesterol     Hypertension     Right shoulder pain      Past Surgical History:   Procedure Laterality Date    HX CHOLECYSTECTOMY  9/11/01    HX ORTHOPAEDIC  7/2015    CTS surgery Dr. Rigoberto Lemon     Current Outpatient Medications   Medication Sig    meloxicam (MOBIC) 15 mg tablet Take 1 Tablet by mouth daily.  famotidine (PEPCID) 40 mg tablet Take 1 Tablet by mouth daily.  ascorbic acid, vitamin C, (Vitamin C) 1,000 mg tablet Take  by mouth.  coenzyme q10 (Co Q-10) 10 mg cap Take  by mouth.  rosuvastatin (CRESTOR) 5 mg tablet Take 1 Tablet by mouth daily.  colchicine (Mitigare) 0.6 mg capsule Take 1 Capsule by mouth two (2) times a day.  atenoloL (TENORMIN) 50 mg tablet TAKE 1 TABLET BY MOUTH DAILY.  losartan (COZAAR) 100 mg tablet TAKE 1 TABLET BY MOUTH EVERY DAY    ergocalciferol, vitamin D2, (VITAMIN D2 PO) Take  by mouth.  docosahexaenoic acid/epa (FISH OIL PO) Take  by mouth.  fluticasone propionate (FLONASE) 50 mcg/actuation nasal spray 2 Sprays by Both Nostrils route daily. (Patient not taking: Reported on 7/21/2021)    multivitamin (ONE A DAY) tablet Take 1 Tab by mouth daily. No current facility-administered medications for this visit. Allergies   Allergen Reactions    Statins-Hmg-Coa Reductase Inhibitors Myalgia    Tricor [Fenofibrate Micronized] Myalgia     Social History     Occupational History    Not on file   Tobacco Use    Smoking status: Never Smoker    Smokeless tobacco: Never Used   Substance and Sexual Activity    Alcohol use: No    Drug use: No    Sexual activity: Yes     Partners: Female     History reviewed. No pertinent family history.      DIAGNOSTIC LAB DATA      Lab Results   Component Value Date/Time    Hemoglobin A1c 6.1 (H) 11/13/2021 07:15 AM    Hemoglobin A1c 6.1 (H) 07/15/2021 12:34 PM    //   Lab Results   Component Value Date/Time    Glucose 122 (H) 11/13/2021 07:15 AM        No results found for: LSW3ORON, [id number]      Lab Results   Component Value Date/Time    VITAMIN D, 25-HYDROXY 27.4 (L) 11/13/2021 07:15 AM        Drug Screen Most Recent Result Date    No resulted procedures found. REVIEW OF SYSTEMS : 12/14/2021  ALL BELOW ARE Negative except : SEE HPI     All other systems reviewed and are negative. 12 point review of systems otherwise negative unless noted in HPI. RADIOGRAPHS// IMAGING//DIAGNOSTIC DATA     Orders Placed This Encounter    AMB POC XRAY, FOOT; COMPLETE, 3+ VIEW    MRI ANKLE LT WO CONT    meloxicam (MOBIC) 15 mg tablet    famotidine (PEPCID) 40 mg tablet          Foot left X-rays,   views, AP/LAT/OBL completed 12/14/2021 AT [address] [place] [address]    His x-rays, 3 views, left foot today, AP, lateral oblique images, today, show a calcific bone spur at the insertion point of this left, Achilles tendon, associated soft tissue swelling. Otherwise I see no osteolytic or osteoblastic lesions on these x-rays. I have personally reviewed the images of the above study. The interpretation of this study is my professional opinion            I have spent 30 minutes reviewing the previous notes, reviewing diagnostic studies [Advanced  Imaging, Diagnostic test results (x-rays)] and had a direct face to face with the patient discussing the diagnosis and importance of compliance with the treatment and plan. There is  discussion for the potential for surgery, answering all questions, as well as documenting patient care coordination for this individual on the day of the visit. Disclaimer:     Sections of this note are dictated using utilizing voice recognition software, which may have resulted in some phonetic based errors in grammar and contents. Even though attempts were made to correct all the mistakes, some may have been missed, and remained in the body of the document. If questions arise, please contact our department. An electronic signature was used to authenticate this note. Cheryl Butterfield may have a reminder for a \"due or due soon\" health maintenance.  I have asked that he contact his primary care provider for follow-up on this health maintenance. There are no Patient Instructions on file for this visit. Please follow up with your PCP for any health maintenance as recommended. Jayesh Padilla, as dictated by, Jacqueline Rincon.   12/14/2021  2:29 PM

## 2021-12-14 ENCOUNTER — OFFICE VISIT (OUTPATIENT)
Dept: ORTHOPEDIC SURGERY | Age: 56
End: 2021-12-14
Payer: COMMERCIAL

## 2021-12-14 VITALS — TEMPERATURE: 97.1 F | OXYGEN SATURATION: 98 % | BODY MASS INDEX: 41.69 KG/M2 | HEART RATE: 76 BPM | WEIGHT: 274.2 LBS

## 2021-12-14 DIAGNOSIS — M76.62 ACHILLES TENDINITIS OF LEFT LOWER EXTREMITY: Primary | ICD-10-CM

## 2021-12-14 DIAGNOSIS — M79.672 LEFT FOOT PAIN: ICD-10-CM

## 2021-12-14 PROCEDURE — 99203 OFFICE O/P NEW LOW 30 MIN: CPT | Performed by: ORTHOPAEDIC SURGERY

## 2021-12-14 PROCEDURE — 73630 X-RAY EXAM OF FOOT: CPT | Performed by: ORTHOPAEDIC SURGERY

## 2021-12-14 RX ORDER — MELOXICAM 15 MG/1
15 TABLET ORAL DAILY
Qty: 30 TABLET | Refills: 1 | Status: SHIPPED | OUTPATIENT
Start: 2021-12-14 | End: 2022-07-20

## 2021-12-14 RX ORDER — FAMOTIDINE 40 MG/1
40 TABLET, FILM COATED ORAL DAILY
Qty: 30 TABLET | Refills: 1 | Status: SHIPPED | OUTPATIENT
Start: 2021-12-14 | End: 2022-11-03

## 2022-01-07 ENCOUNTER — HOSPITAL ENCOUNTER (OUTPATIENT)
Age: 57
Discharge: HOME | End: 2022-01-07
Attending: ORTHOPAEDIC SURGERY
Payer: COMMERCIAL

## 2022-01-07 DIAGNOSIS — M79.672 LEFT FOOT PAIN: ICD-10-CM

## 2022-01-07 DIAGNOSIS — M76.62 ACHILLES TENDINITIS OF LEFT LOWER EXTREMITY: ICD-10-CM

## 2022-01-07 PROCEDURE — 73721 MRI JNT OF LWR EXTRE W/O DYE: CPT

## 2022-01-18 RX ORDER — ATENOLOL 50 MG/1
TABLET ORAL
Qty: 90 TABLET | Refills: 1 | Status: SHIPPED | OUTPATIENT
Start: 2022-01-18 | End: 2022-07-02

## 2022-01-28 DIAGNOSIS — R73.03 PREDIABETES: ICD-10-CM

## 2022-02-08 ENCOUNTER — OFFICE VISIT (OUTPATIENT)
Dept: ORTHOPEDIC SURGERY | Age: 57
End: 2022-02-08
Payer: COMMERCIAL

## 2022-02-08 VITALS
HEART RATE: 74 BPM | BODY MASS INDEX: 41.52 KG/M2 | WEIGHT: 274 LBS | HEIGHT: 68 IN | OXYGEN SATURATION: 98 % | TEMPERATURE: 97.1 F

## 2022-02-08 DIAGNOSIS — M76.62 ACHILLES TENDINITIS OF LEFT LOWER EXTREMITY: Primary | ICD-10-CM

## 2022-02-08 PROCEDURE — 99214 OFFICE O/P EST MOD 30 MIN: CPT | Performed by: ORTHOPAEDIC SURGERY

## 2022-02-08 NOTE — PROGRESS NOTES
AMBULATORY PROGRESS NOTE      Patient: Km Warren             MRN: [id number]     SSN: [id number] Body mass index is 41.66 kg/m². YOB: 1965     AGE: 62 y.o. EX: male    PCP: Mario Restrepo MD       IMPRESSION //  DIAGNOSIS AND TREATMENT PLAN        Km Warren has a diagnosis of:      Conservative treatment, as recommended for him, should symptoms not improve, may require surgical intervention. I did review the MRI findings with him     DIAGNOSES    1. Achilles tendinitis of left lower extremity        Orders Placed This Encounter    Generic Supply Order     ACHILLOTRAIN sleeve (LMS)    [email or web address] [address]     Referral Priority:   Routine     Referral Type:   PT/OT/ST     Referral Reason:   Specialty Services Required     Number of Visits Requested:   1            PLAN:    1. Referral to PT  2. DME: ACHILLOtrain ankle sleeve  3. Apply Voltaren Gel to achilles tendon    RTO: 5 weeks    Patient Instructions   voltaren gel OTC          Please follow up with your PCP for any health maintenance as recommended         Km Warren  expresses understanding of the diagnosis, treatment plan, and all of their proposed questions were answered to their satisfaction. Patient education has been provided re the diagnoses. HPI //  Bo Garcia IS A 62 y.o. male who is a/an  established patient, presenting to my outpatient office for evaluation of  the following chief complaint(s):     Chief Complaint   Patient presents with    Ankle Pain     left ankle, mri results       Km Warren presents today for MRI results. He notes difficulty walking down stairs. He mentions he has more left ankle pain with hard sole shoe as suppose to soft sole shoes.       Visit Vitals  Pulse 74   Temp 97.1 °F (36.2 °C) (Temporal)   Ht 5' 8\" (1.727 m)   Wt 274 lb (124.3 kg)   SpO2 98%   BMI 41.66 kg/m²       Appearance: Alert, well appearing and pleasant patient who is in no distress, oriented to person, place/time, and who follows commands. Normal dress/motor activity/thought processes/memory. This patient is accompanied in the examination room by his  self. Patient arrives to office via: without assistive device:     Psychiatric:  Normal Affect/mood. Judgement, behavior, and conduct are appropriate. Speech normal in context and clarity, memory intact grossly, no involuntary movements - tremors. H EENT (2): Head normocephalic & atraumatic. Eye: pupils are round// EOM are intact // Neck: ROM WNL  // Hearings Intact   Respiratory: Breathing non labored     ANKLE/FOOT left    Gait: normal  Tenderness: mild over  Achilles tendon  Cutaneous:  WNL. Joint Motion:  WNL   Joint / Tendon Stability:  No Ankle or Subtalar instability or joint laxity. No peroneal sublux ability or dislocation  Alignment: neutral Hindfoot,    Neuro Motor/Sensory: NL/NL  Vascular: NL foot/ankle pulses,   Lymphatics: No extremity lymphedema, No calf swelling, no tenderness to calf muscles. CHART REVIEW     Kelin Callejas has been experiencing pain and discomfort confirmed as outlined in the pain assessment outlined below.  was reviewed by Savita Ashton MD on 2/8/2022. Pain Assessment  2/8/2022   Location of Pain Ankle   Location Modifiers Left   Severity of Pain 2   Quality of Pain Dull   Quality of Pain Comment -   Duration of Pain -   Frequency of Pain -   Date Pain First Started -   Aggravating Factors -   Aggravating Factors Comment -   Limiting Behavior -   Relieving Factors -   Relieving Factors Comment -   Result of Injury -        Kelin Callejas  has a past medical history of High cholesterol, Hypertension, and Right shoulder pain.      Patients is employed at:         Past Medical History:   Diagnosis Date    High cholesterol     Hypertension     Right shoulder pain      Past Surgical History:   Procedure Laterality Date    HX CHOLECYSTECTOMY  9/11/01    HX ORTHOPAEDIC  7/2015    CTS surgery Dr. Sánchez Lopez     Current Outpatient Medications   Medication Sig    atenoloL (TENORMIN) 50 mg tablet TAKE 1 TABLET BY MOUTH DAILY.  meloxicam (MOBIC) 15 mg tablet Take 1 Tablet by mouth daily.  famotidine (PEPCID) 40 mg tablet Take 1 Tablet by mouth daily.  ascorbic acid, vitamin C, (Vitamin C) 1,000 mg tablet Take  by mouth.  coenzyme q10 (Co Q-10) 10 mg cap Take  by mouth.  rosuvastatin (CRESTOR) 5 mg tablet Take 1 Tablet by mouth daily.  colchicine (Mitigare) 0.6 mg capsule Take 1 Capsule by mouth two (2) times a day.  losartan (COZAAR) 100 mg tablet TAKE 1 TABLET BY MOUTH EVERY DAY    ergocalciferol, vitamin D2, (VITAMIN D2 PO) Take  by mouth.  docosahexaenoic acid/epa (FISH OIL PO) Take  by mouth.  multivitamin (ONE A DAY) tablet Take 1 Tab by mouth daily.  fluticasone propionate (FLONASE) 50 mcg/actuation nasal spray 2 Sprays by Both Nostrils route daily. (Patient not taking: Reported on 7/21/2021)     No current facility-administered medications for this visit. Allergies   Allergen Reactions    Statins-Hmg-Coa Reductase Inhibitors Myalgia    Tricor [Fenofibrate Micronized] Myalgia     Social History     Occupational History    Not on file   Tobacco Use    Smoking status: Never Smoker    Smokeless tobacco: Never Used   Substance and Sexual Activity    Alcohol use: No    Drug use: No    Sexual activity: Yes     Partners: Female     History reviewed. No pertinent family history.      DIAGNOSTIC LAB DATA      Lab Results   Component Value Date/Time    Hemoglobin A1c 6.1 (H) 11/13/2021 07:15 AM    Hemoglobin A1c 6.1 (H) 07/15/2021 12:34 PM    //   Lab Results   Component Value Date/Time    Glucose 122 (H) 11/13/2021 07:15 AM        No results found for: [id number], [id number]      Lab Results   Component Value Date/Time    VITAMIN D, 25-HYDROXY 27.4 (L) 11/13/2021 07:15 AM        Drug Screen Most Recent Result Date    No resulted procedures found. REVIEW OF SYSTEMS : 2/8/2022  ALL BELOW ARE Negative except : SEE HPI     All other systems reviewed and are negative. 12 point review of systems otherwise negative unless noted in HPI. RADIOGRAPHS// IMAGING//DIAGNOSTIC DATA     Orders Placed This Encounter    Generic Supply Order    EMPL [address]           MRI Results (most recent):  Results from Hospital Encounter encounter on 01/07/22    MRI ANKLE LT WO CONT    Narrative  MRI ANKLE LT WO CONT: 1/7/2022 4:32 PM    CLINICAL INFORMATION  Left foot pain. Achilles tendinitis. Assess distal Achilles tendon. COMPARISON  None. TECHNIQUE  Multiplane MR images of the left ankle obtained including T1 and T2-weighted  sequences. FINDINGS  OSSEOUS STRUCTURES/JOINTS  Mild/moderate tibiotalar osteoarthritis with osteochondral lesions of the medial  and lateral tibial plafond's. Slight cortical depression of the medial tibial  plafond. No unstable fragment identified. Mild tibiotalar and subtalar joint  effusions. LATERAL COMPARTMENT  Ligaments: Normal.  Peroneal tendons: Normal.    MEDIAL COMPARTMENT  Ligaments: Prominent deltoid ligament scarring with small intratendinous  corticated ossific fragments, sequela of remote injury. Medial tendons: Mild/moderate insertional posterior tibial tendinosis. Focal  mild/moderate flexor hallucis longus tendinosis at the level of the tarsal  tunnel. Tarsal tunnel: Normal.    ANTERIOR COMPARTMENT  Extensor tendons: Normal.    POSTERIOR COMPARTMENT  Achilles tendon: In the patient's area of clinical concern denoted by external  skin marker, focal moderate medial Achilles insertional tendinosis with  low-grade partial-thickness interstitial tearing. Mild retrocalcaneal bursitis. No significant Haglunds deformity. Remainder of the Achilles tendon shows  mild/moderate tendinosis diffusely. Plantar fascia: Moderate chronic plantar origin fasciitis. Approximately 4.5 cm  distal to the origin of the plantar fascia, there is subcentimeter nodular  thickening with mineralization of the medial plantar cord-suspect sequela of  remote trauma or procedure versus less likely plantar fibroma. Kagers fat-pad: Mild edema. SINUS TARSI  Infiltration of fat raising the potential for some component of sinus tarsi  syndrome. .    MUSCLES  Normal muscle volume and signal intensity. Impression  In the patient's area of clinical concern denoted by external skin marker, focal  moderate medial insertional Achilles tendinosis with low-grade partial-thickness  interstitial tearing. Remainder of the Achilles tendon shows mild/moderate  tendinosis diffusely. Numerous incidental/chronic findings as above. I have spent 30 minutes reviewing the previous notes, reviewing diagnostic studies [Advanced  Imaging, Diagnostic test results (x-rays)] and had a direct face to face with the patient discussing the diagnosis and importance of compliance with the treatment and plan. There is  discussion for the potential for surgery, answering all questions, as well as documenting patient care coordination for this individual on the day of the visit. Disclaimer:     Sections of this note are dictated using utilizing voice recognition software, which may have resulted in some phonetic based errors in grammar and contents. Even though attempts were made to correct all the mistakes, some may have been missed, and remained in the body of the document. If questions arise, please contact our department. An electronic signature was used to authenticate this note. Clarise Oakdale may have a reminder for a \"due or due soon\" health maintenance. I have asked that he contact his primary care provider for follow-up on this health maintenance. Patient Instructions   voltaren gel OTC          Please follow up with your PCP for any health maintenance as recommended. Maximo Gamboa, as dictated byDarlene  2/8/2022  8:22 AM

## 2022-02-17 ENCOUNTER — HOSPITAL ENCOUNTER (OUTPATIENT)
Dept: PHYSICAL THERAPY | Age: 57
Discharge: HOME | End: 2022-02-17
Attending: ORTHOPAEDIC SURGERY
Payer: COMMERCIAL

## 2022-02-17 PROCEDURE — 97162 PT EVAL MOD COMPLEX 30 MIN: CPT

## 2022-02-17 PROCEDURE — 97110 THERAPEUTIC EXERCISES: CPT

## 2022-02-17 PROCEDURE — 97530 THERAPEUTIC ACTIVITIES: CPT

## 2022-02-17 NOTE — PROGRESS NOTES
[facility]  [address]  [place], [address]  [phone] fax    Plan of Care/ Statement of Necessity for Physical Therapy Services    Patient name: Manolo Cee Start of Care: 2022   Referral source: Maria D Archibald MD : 1965    Medical Diagnosis: Right ankle pain [M25.571]  Payor: Radha Memory / Plan: [place] [employer] / Product Type: PPO /  Onset Date:8 months ago    Treatment Diagnosis: Left achilles tendonopathy   Prior Hospitalization: see medical history Provider#: [id number]   Medications: Verified on Patient summary List    Comorbidities: HTN, Gall bladder removal, Carpal tunnel release, Trigger Finger surgery, BMI > 30   Prior Level of Function: The patient states he had improved ease of ambulation prior to onset. The Plan of Care and following information is based on the information from the initial evaluation. Assessment/ key information: The patient is a 62year old male with a chief complaint of right and left ankle pain that has been since 8 months ago. He states that he had started doing some running and on the second time he began running he felt pain of his left heel which has bothered him since. He states he does have pain of the right front of his ankle as well. The patient reports he has continued to walk, but does have pain afterwards in his left ankle. PT recommended cross training with a stationary bike in order to allow his left achilles to calm down, he agrees. The patient has point tenderness of his medial achilles of his left, but is able to eccentrically lower with % 10 times without increase in pain. His gastroc/soleus calf complex is considerably limitation concerning flexibility to neutral on his left, and 5 degrees on his right.  The patient has signs and symptoms consistent with achilles tendonopathy with associated impairments consisting of pain, decreased ROM, decreased flexibility, decreased strength, and limited ease of ambulation. The patient will benefit from skilled PT in order to address the aforementioned. Evaluation Complexity History MEDIUM  Complexity : 1-2 comorbidities / personal factors will impact the outcome/ POC ; Examination MEDIUM Complexity : 3 Standardized tests and measures addressing body structure, function, activity limitation and / or participation in recreation  ;Presentation MEDIUM Complexity : Evolving with changing characteristics  ; Clinical Decision Making MEDIUM Complexity : FOTO score of 26-74  Overall Complexity Rating: MEDIUM  Problem List: pain affecting function, decrease ROM, decrease strength, decrease ADL/ functional abilitiies, decrease activity tolerance, decrease flexibility/ joint mobility and decrease transfer abilities   Treatment Plan may include any combination of the following: Therapeutic exercise, Therapeutic activities, Neuromuscular re-education, Physical agent/modality, Manual therapy, Patient education, Self Care training, Functional mobility training and Home safety training  Patient / Family readiness to learn indicated by: asking questions, trying to perform skills and interest  Persons(s) to be included in education: patient (P)  Barriers to Learning/Limitations: None  Patient Goal (s): Rediuce pain  Patient Self Reported Health Status: good  Rehabilitation Potential: good    Short Term Goals: To be accomplished in 2 weeks:   1. The patient will demonstrate independence and compliance with HEP to maximize therapeutic benefit. 2. The patient will improve gastroc flexibility to 10 degrees B ankles to improve ease of ADls. Long Term Goals: To be accomplished in 4 weeks:   1. The patient will improve FOTO score to 76 to improve ease of ADLs. 2. The patient will demonstrate stair negotiation void of pain in order to improve ease of negotiating stairs to dwelling.    3. The patient will report performing walking program without increase in pain to improve ease of recreational activity. 4. The patient will demonstrate negative tenderness through left achilles tendon insertion to improve ease of ambulation. Frequency / Duration: Patient to be seen 2 times per week for 4 weeks. Patient/ Caregiver education and instruction: Diagnosis, prognosis, self care, activity modification and exercises   [x]  Plan of care has been reviewed with RAJNI Hopson, PT 2/17/2022 4:26 PM    ________________________________________________________________________    I certify that the above Therapy Services are being furnished while the patient is under my care. I agree with the treatment plan and certify that this therapy is necessary.     Physician's Signature:____________Date:_________TIME:________     You Hdz MD  ** Signature, Date and Time must be completed for valid certification **    Please sign and return to [facility] [address]  [address]  [place], [address]  [phone] fax

## 2022-02-17 NOTE — PROGRESS NOTES
PT DAILY TR EATMENT NOTE     Patient Name: Kevin Acevedo  Date:2022  : 1965  [x]  Patient  Verified  Payor: Radames Mayfield / Plan: [place] [employer] / Product Type: PPO /    In time:3:45  Out time:4:20  Total Treatment Time (min): 35  Visit #: 1 of 8    Treatment Area: Right ankle pain [M25.571]    SUBJECTIVE  Pain Level (0-10 scale): 3/10  Any medication changes, allergies to medications, adverse drug reactions, diagnosis change, or new procedure performed?: [x] No    [] Yes (see summary sheet for update)  Subjective functional status/changes:   [] No changes reported  The patient has a chief complaint of left achilles pain that limits his ease of ambulating. OBJECTIVE   22 min []Eval                  []Re-Eval       15 min Therapeutic Exercise:  [] See flow sheet :   Rationale: increase ROM and increase strength to improve the patients ability to improve ADL ease. 8 min Therapeutic Activity:  []  See flow sheet : education regarding treatment rationale, relevant anatomy as well as importance of stretching/eccentric strengthening and demonstrations regarding how this is accomplished. Rationale: increase ROM and increase strength  to improve the patients ability to improve ADl ease.            With   [] TE   [] TA   [] neuro   [] other: Patient Education: [x] Review HEP    [] Progressed/Changed HEP based on:   [] positioning   [] body mechanics   [] transfers   [] heat/ice application    [] other:      Other Objective/Functional Measures: See IE     Pain Level (0-10 scale) post treatment: 0/10    ASSESSMENT/Changes in Function: See POC    Patient will continue to benefit from skilled PT services to modify and progress therapeutic interventions, address functional mobility deficits, address ROM deficits, address strength deficits, analyze and address soft tissue restrictions, analyze and cue movement patterns, analyze and modify body mechanics/ergonomics, assess and modify postural abnormalities and instruct in home and community integration to attain remaining goals. [x]  See Plan of Care  []  See progress note/recertification  []  See Discharge Summary         Progress towards goals / Updated goals:  Short Term Goals: To be accomplished in 2 weeks:               1. The patient will demonstrate independence and compliance with HEP to maximize therapeutic benefit. IE: issued HEP               2. The patient will improve gastroc flexibility to 10 degrees B ankles to improve ease of ADls. IE: Neutral  Long Term Goals: To be accomplished in 4 weeks:               1. The patient will improve FOTO score to 76 to improve ease of ADLs. IE: 79               2. The patient will demonstrate stair negotiation void of pain in order to improve ease of negotiating stairs to dwelling. IE: pain upon descending stairs               3. The patient will report performing walking program without increase in pain to improve ease of recreational activity. IE: currently has pain walking               4. The patient will demonstrate negative tenderness through left achilles tendon insertion to improve ease of ambulation.    IE: painful with palpation    PLAN  [x]  Upgrade activities as tolerated     []  Continue plan of care  []  Update interventions per flow sheet       []  Discharge due to:_  []  Other:_      Karlo Rivera, PT 2/17/2022  4:53 PM    Future Appointments   Date Time Provider Carla Abdul   2/21/2022 11:30 AM Edwin Gorman, PT [facility]   2/23/2022  4:00 PM Shanna Suazo, PTA [facility]   2/28/2022  4:45 PM Shanna Suazo, PTA [facility]   3/2/2022  4:00 PM Shanna Suazo, PTA [facility]   3/7/2022  4:45 PM Shanna Suazo, PTA [facility]   3/9/2022  4:00 PM Shanna Suazo, PTA [facility]   3/14/2022  4:45 PM Shanna Suazo, PTA [facility]   3/22/2022  3:40 PM Juan Pablo Linda MD [facility] BS [facility]

## 2022-02-21 ENCOUNTER — HOSPITAL ENCOUNTER (OUTPATIENT)
Dept: PHYSICAL THERAPY | Age: 57
Discharge: HOME | End: 2022-02-21
Attending: ORTHOPAEDIC SURGERY
Payer: COMMERCIAL

## 2022-02-21 PROCEDURE — 97140 MANUAL THERAPY 1/> REGIONS: CPT

## 2022-02-21 PROCEDURE — 97530 THERAPEUTIC ACTIVITIES: CPT

## 2022-02-21 PROCEDURE — 97110 THERAPEUTIC EXERCISES: CPT

## 2022-02-23 ENCOUNTER — HOSPITAL ENCOUNTER (OUTPATIENT)
Dept: PHYSICAL THERAPY | Age: 57
Discharge: HOME | End: 2022-02-23
Attending: ORTHOPAEDIC SURGERY
Payer: COMMERCIAL

## 2022-02-23 PROCEDURE — 97112 NEUROMUSCULAR REEDUCATION: CPT

## 2022-02-23 PROCEDURE — 97110 THERAPEUTIC EXERCISES: CPT

## 2022-02-23 PROCEDURE — 97140 MANUAL THERAPY 1/> REGIONS: CPT

## 2022-02-23 PROCEDURE — 97035 APP MDLTY 1+ULTRASOUND EA 15: CPT

## 2022-02-23 NOTE — PROGRESS NOTES
PT DAILY TREATMENT NOTE     Patient Name: Manolo Cee  Date:2022  : 1965  [x]  Patient  Verified  Payor: Radha [employer] / Plan: [place] [employer]PO / Product Type: PPO /    In time:3:53  Out time:4:35  Total Treatment Time (min): 42  Visit #: 3 of 8    Treatment Area: Right ankle pain [M25.571]    SUBJECTIVE  Pain Level (0-10 scale): 0  Any medication changes, allergies to medications, adverse drug reactions, diagnosis change, or new procedure performed?: [x] No    [] Yes (see summary sheet for update)  Subjective functional status/changes:   [] No changes reported  The pt reports no pain at the moment.      OBJECTIVE    Modality rationale: decrease pain and increase tissue extensibility to improve the patients ability to perform ADL   Min Type Additional Details    [] Estim:  []Unatt       []IFC  []Premod                        []Other:  []w/ice   []w/heat  Position:  Location:    [] Estim: []Att    []TENS instruct  []NMES                    []Other:  []w/US   []w/ice   []w/heat  Position:  Location:    []  Traction: [] Cervical       []Lumbar                       [] Prone          []Supine                       []Intermittent   []Continuous Lbs:  [] before manual  [] after manual   6'+2' set up [x]  Ultrasound: [x]Continuous   [] Pulsed                           []1MHz   [x]3MHz W/cm2:1.0  Location:left achilles    []  Iontophoresis with dexamethasone         Location: [] Take home patch   [] In clinic    []  Ice     []  heat  []  Ice massage  []  Laser   []  Anodyne Position:  Location:    []  Laser with stim  []  Other:  Position:  Location:    []  Vasopneumatic Device    []  Right     []  Left  Pre-treatment girth:  Post-treatment girth:  Measured at (location):  Pressure:       [] lo [] med [] hi   Temperature: [] lo [] med [] hi   [] Skin assessment post-treatment:  []intact []redness- no adverse reaction    []redness  adverse reaction:       18 min Therapeutic Exercise:  [x] See flow sheet : Rationale: increase ROM and increase strength to improve the patients ability to perform ADL       8 min Neuromuscular Re-education:  [x]  See flow sheet : airex hipx3, eccentrics   Rationale: increase strength, improve coordination and increase proprioception  to improve the patients ability to perform ADL    8 min Manual Therapy:  Pt prone performed Graston with GT to left achilles. Manual gastroc stretch following. The manual therapy interventions were performed at a separate and distinct time from the therapeutic activities interventions. Rationale: decrease pain, increase ROM and increase tissue extensibility to perform ADL            With   [] TE   [] TA   [] neuro   [] other: Patient Education: [x] Review HEP    [] Progressed/Changed HEP based on:   [] positioning   [] body mechanics   [] transfers   [] heat/ice application    [] other:      Other Objective/Functional Measures: increased reps with HR on leg press, added US trial      Pain Level (0-10 scale) post treatment: 0    ASSESSMENT/Changes in Function: The pt appears to be progressing well with PT. He presents today without c/o pain and progressed with reps as noted above without compliant. Although not formally measured, gastroc flexibility is visually improving. Patient will continue to benefit from skilled PT services to modify and progress therapeutic interventions, address functional mobility deficits, address ROM deficits, address strength deficits, analyze and address soft tissue restrictions and analyze and cue movement patterns to attain remaining goals. []  See Plan of Care  []  See progress note/recertification  []  See Discharge Summary         Progress towards goals / Updated goals:  Short Term Goals: To be accomplished in 2 weeks:               1.  The patient will demonstrate independence and compliance with HEP to maximize therapeutic benefit.              IE: issued HEP               Current: reports compliance and improvement in symptoms (2/21/2022)               2. The patient will improve gastroc flexibility to 10 degrees B ankles to improve ease of ADls.             CK: Neutral   Current: improving - 2-23-22  Long Term Goals: To be accomplished in 4 weeks:               1. The patient will improve FOTO score to 76 to improve ease of ADLs.             YB: 06               8. The patient will demonstrate stair negotiation void of pain in order to improve ease of negotiating stairs to dwelling.              IE: pain upon descending stairs               3. The patient will report performing walking program without increase in pain to improve ease of recreational activity.             CP: currently has pain walking               4.  The patient will demonstrate negative tenderness through left achilles tendon insertion to improve ease of ambulation.              IE: painful with palpation    PLAN  [x]  Upgrade activities as tolerated     []  Continue plan of care  []  Update interventions per flow sheet       []  Discharge due to:_  []  Other:_      Angela Hanna PT 2/23/2022  3:46 PM    Future Appointments   Date Time Provider Carla Chantell   2/23/2022  4:00 PM Mary Benedict PT [facility]   2/28/2022  4:45 PM Leory Agatha, PTA [facility]   3/2/2022  4:00 PM Leory Agatha, PTA [facility]   3/7/2022  4:45 PM Leory Agatha, PTA [facility]   3/9/2022  4:00 PM Leory Agatha, PTA [facility]   3/14/2022  4:45 PM Leory Agatha, PTA [facility]   3/22/2022  3:40 PM Ruthie Linda MD [facility] BS AM[facility]

## 2022-02-28 ENCOUNTER — HOSPITAL ENCOUNTER (OUTPATIENT)
Dept: PHYSICAL THERAPY | Age: 57
Discharge: HOME | End: 2022-02-28
Attending: ORTHOPAEDIC SURGERY
Payer: COMMERCIAL

## 2022-02-28 PROCEDURE — 97035 APP MDLTY 1+ULTRASOUND EA 15: CPT

## 2022-02-28 PROCEDURE — 97112 NEUROMUSCULAR REEDUCATION: CPT

## 2022-02-28 PROCEDURE — 97110 THERAPEUTIC EXERCISES: CPT

## 2022-02-28 PROCEDURE — 97140 MANUAL THERAPY 1/> REGIONS: CPT

## 2022-02-28 NOTE — PROGRESS NOTES
PT DAILY TREATMENT NOTE     Patient Name: Nargis Bonds  Date:2022  : 1965  [x]  Patient  Verified  Payor: Santana Reyna / Plan: [place] [employer] PPO / Product Type: PPO /    In time:4:45  Out time:5:27  Total Treatment Time (min): 42  Visit #: 4 of 8    Treatment Area: Right ankle pain [M25.571]    SUBJECTIVE  Pain Level (0-10 scale): 2/10  Any medication changes, allergies to medications, adverse drug reactions, diagnosis change, or new procedure performed?: [x] No    [] Yes (see summary sheet for update)  Subjective functional status/changes:   [] No changes reported  Pt reports no new complaints of pain. Pt reports his ankle is  \"doing alright. \"    OBJECTIVE    Modality rationale: decrease inflammation and decrease pain to improve the patients ability to perform ADLs with increased ease.     Min Type Additional Details    [] Estim:  []Unatt       []IFC  []Premod                        []Other:  []w/ice   []w/heat  Position:  Location:    [] Estim: []Att    []TENS instruct  []NMES                    []Other:  []w/US   []w/ice   []w/heat  Position:  Location:    []  Traction: [] Cervical       []Lumbar                       [] Prone          []Supine                       []Intermittent   []Continuous Lbs:  [] before manual  [] after manual   6+2 [x]  Ultrasound: [x]Continuous   [] Pulsed                           []1MHz   []3MHz W/cm2:1.0  Location: left achilles    []  Iontophoresis with dexamethasone         Location: [] Take home patch   [] In clinic    []  Ice     []  heat  []  Ice massage  []  Laser   []  Anodyne Position:  Location:    []  Laser with stim  []  Other:  Position:  Location:    []  Vasopneumatic Device    []  Right     []  Left  Pre-treatment girth:  Post-treatment girth:  Measured at (location):  Pressure:       [] lo [] med [] hi   Temperature: [] lo [] med [] hi   [] Skin assessment post-treatment:  []intact []redness- no adverse reaction    []redness  adverse reaction:     24 min Therapeutic Exercise:  [x] See flow sheet :   Rationale: increase ROM and increase strength to improve the patients ability to perform ADLs with increased ease. 10 min Neuromuscular Re-education:  [x]  See flow sheet :   Rationale: increase ROM and increase strength  to improve the patients ability to perform ADLs with increased ease. 8 min Manual Therapy:  Pt in prone: Manual gastroc stretch with DTM to achilles tendon and medial and lateral gastroc. The manual therapy interventions were performed at a separate and distinct time from the therapeutic activities interventions. Rationale: decrease pain, increase ROM and increase tissue extensibility to improve tolerance to functional activities. With   [] TE   [] TA   [] neuro   [] other: Patient Education: [x] Review HEP    [] Progressed/Changed HEP based on:   [] positioning   [] body mechanics   [] transfers   [] heat/ice application    [] other:      Other Objective/Functional Measures:      Pain Level (0-10 scale) post treatment: 0/10    ASSESSMENT/Changes in Function: Pt has decreased pain post treatment and demonstrates improved ankle mobility in weight bearing position. Patient will continue to benefit from skilled PT services to modify and progress therapeutic interventions, address functional mobility deficits, address ROM deficits, address strength deficits, analyze and address soft tissue restrictions, analyze and cue movement patterns, analyze and modify body mechanics/ergonomics and assess and modify postural abnormalities to attain remaining goals. []  See Plan of Care  []  See progress note/recertification  []  See Discharge Summary         Progress towards goals / Updated goals:  Short Term Goals: To be accomplished in 2 weeks:               1.  The patient will demonstrate independence and compliance with HEP to maximize therapeutic benefit.              IE: issued HEP               [id number]: reports compliance and improvement in symptoms (2/21/2022)               2. The patient will improve gastroc flexibility to 10 degrees B ankles to improve ease of ADls.             RT: Neutral              Current: improving - 2-23-22  Long Term Goals: To be accomplished in 4 weeks:               1. The patient will improve FOTO score to 76 to improve ease of ADLs.             BM: 84               9. The patient will demonstrate stair negotiation void of pain in order to improve ease of negotiating stairs to dwelling.              IE: pain upon descending stairs               3. The patient will report performing walking program without increase in pain to improve ease of recreational activity.             TE: currently has pain walking               4.  The patient will demonstrate negative tenderness through left achilles tendon insertion to improve ease of ambulation.              IE: painful with palpation    PLAN  []  Upgrade activities as tolerated     [x]  Continue plan of care  []  Update interventions per flow sheet       []  Discharge due to:_  []  Other:_      Wesly Horne PTA 2/28/2022  4:52 PM    Future Appointments   Date Time Provider Carla Abdul   3/2/2022  4:00 PM Echo Sktegans, PTA [facility]   3/7/2022  4:45 PM Echo Sktegans, PTA [facility]   3/9/2022  4:00 PM Echo Skeens, PTA [facility]   3/14/2022  4:45 PM Echo Sktegans, PTA [facility]   3/22/2022  3:40 PM Sophia Linda MD [facility] BS AM[facility]

## 2022-03-02 ENCOUNTER — HOSPITAL ENCOUNTER (OUTPATIENT)
Dept: PHYSICAL THERAPY | Age: 57
Discharge: HOME | End: 2022-03-02
Attending: ORTHOPAEDIC SURGERY
Payer: COMMERCIAL

## 2022-03-02 PROCEDURE — 97035 APP MDLTY 1+ULTRASOUND EA 15: CPT

## 2022-03-02 PROCEDURE — 97112 NEUROMUSCULAR REEDUCATION: CPT

## 2022-03-02 PROCEDURE — 97140 MANUAL THERAPY 1/> REGIONS: CPT

## 2022-03-02 PROCEDURE — 97110 THERAPEUTIC EXERCISES: CPT

## 2022-03-02 NOTE — PROGRESS NOTES
PT DAILY TREATMENT NOTE     Patient Name: Richard Juares  Date:3/2/2022  : 1965  [x]  Patient  Verified  Payor: Kelle Boss / [place]: [place] [employer] / Product Type: PPO /    In time: 4:00  Out time:4:37  Total Treatment Time (min): 37  Visit #: 5 of 8    Treatment Area: Right ankle pain [M25.571]    SUBJECTIVE  Pain Level (0-10 scale): 1/10  Any medication changes, allergies to medications, adverse drug reactions, diagnosis change, or new procedure performed?: [x] No    [] Yes (see summary sheet for update)  Subjective functional status/changes:   [] No changes reported  Pt reports no new complaints of pain. Pt reports continued increase in flexibility since starting therapy. OBJECTIVE      Modality rationale: decrease pain and increase tissue extensibility to improve the patients ability to perform functional activities with increased ease.     Min Type Additional Details    [] Estim:  []Unatt       []IFC  []Premod                        []Other:  []w/ice   []w/heat  Position:  Location:    [] Estim: []Att    []TENS instruct  []NMES                    []Other:  []w/US   []w/ice   []w/heat  Position:  Location:    []  Traction: [] Cervical       []Lumbar                       [] Prone          []Supine                       []Intermittent   []Continuous Lbs:  [] before manual  [] after manual   8 [x]  Ultrasound: [x]Continuous   [] Pulsed                           [x]1MHz   []3MHz W/cm2:1.0  Location:left achilles    []  Iontophoresis with dexamethasone         Location: [] Take home patch   [] In clinic    []  Ice     []  heat  []  Ice massage  []  Laser   []  Anodyne Position:  Location:    []  Laser with stim  []  Other:  Position:  Location:    []  Vasopneumatic Device    []  Right     []  Left  Pre-treatment girth:  Post-treatment girth:  Measured at (location):  Pressure:       [] lo [] med [] hi   Temperature: [] lo [] med [] hi   [] Skin assessment post-treatment:  []intact []redness- no adverse reaction    []redness  adverse reaction:     11 min Therapeutic Exercise:  [x] See flow sheet :   Rationale: increase ROM and increase strength to improve the patients ability to perform ADLs with increased ease. 10 min Neuromuscular Re-education:  [x]  See flow sheet :   Rationale: increase strength, improve coordination and increase proprioception  to improve the patients ability to perform ADLs with increased ease. 8 min Manual Therapy:  DTM to right gastroc with manual gastroc stretch. The manual therapy interventions were performed at a separate and distinct time from the therapeutic activities interventions. Rationale: decrease pain, increase ROM and increase tissue extensibility to improve functional mobility. With   [] TE   [] TA   [] neuro   [] other: Patient Education: [x] Review HEP    [] Progressed/Changed HEP based on:   [] positioning   [] body mechanics   [] transfers   [] heat/ice application    [] other:      Other Objective/Functional Measures:      Pain Level (0-10 scale) post treatment: 0/10    ASSESSMENT/Changes in Function: Pt has continued improved functional mobility and improved gait mechanics. Patient will continue to benefit from skilled PT services to modify and progress therapeutic interventions, address functional mobility deficits, address ROM deficits, address strength deficits, analyze and address soft tissue restrictions and analyze and cue movement patterns to attain remaining goals. []  See Plan of Care  []  See progress note/recertification  []  See Discharge Summary         Progress towards goals / Updated goals:  Short Term Goals: To be accomplished in 2 weeks:               1. The patient will demonstrate independence and compliance with HEP to maximize therapeutic benefit.              IE: issued HEP               [id number]: reports compliance and improvement in symptoms (2/21/2022)               2.  The patient will improve gastroc flexibility to 10 degrees B ankles to improve ease of ADls.             VA: Neutral              NMAYYKM: improving - 2-23-22  Long Term Goals: To be accomplished in 4 weeks:               1. The patient will improve FOTO score to 76 to improve ease of ADLs.             DK: 33               6. The patient will demonstrate stair negotiation void of pain in order to improve ease of negotiating stairs to dwelling.              IE: pain upon descending stairs               3. The patient will report performing walking program without increase in pain to improve ease of recreational activity.             TX: currently has pain walking               4.  The patient will demonstrate negative tenderness through left achilles tendon insertion to improve ease of ambulation.              IE: painful with palpation    PLAN  []  Upgrade activities as tolerated     []  Continue plan of care  []  Update interventions per flow sheet       []  Discharge due to:_  []  Other:_      Page Regalado, PTA 3/2/2022  4:18 PM    Future Appointments   Date Time Provider Carla Abdul   3/7/2022  4:45 PM Isa Del Cid PTA [facility]   3/9/2022  3:45 PM [facility]ima [facility]   3/16/2022  5:30 PM Mario Stahl [facility]   3/22/2022  3:40 PM Abran Linda MD [facility]

## 2022-03-07 ENCOUNTER — HOSPITAL ENCOUNTER (OUTPATIENT)
Dept: PHYSICAL THERAPY | Age: 57
Discharge: HOME | End: 2022-03-07
Attending: ORTHOPAEDIC SURGERY
Payer: COMMERCIAL

## 2022-03-07 PROCEDURE — 97110 THERAPEUTIC EXERCISES: CPT

## 2022-03-07 PROCEDURE — 97035 APP MDLTY 1+ULTRASOUND EA 15: CPT

## 2022-03-07 PROCEDURE — 97112 NEUROMUSCULAR REEDUCATION: CPT

## 2022-03-07 PROCEDURE — 97140 MANUAL THERAPY 1/> REGIONS: CPT

## 2022-03-07 NOTE — PROGRESS NOTES
PT DAILY TREATMENT NOTE     Patient Name: Jaqui Nix  Date:3/7/2022  : 1965  [x]  Patient  Verified  Payor: Parker Sylvester / [place] [employer] [place] / Product Type: PPO /    In time:4:45  Out time:5:20  Total Treatment Time (min): 35  Visit #: 6 of 8    Treatment Area: Right ankle pain [M25.571]    SUBJECTIVE  Pain Level (0-10 scale): 0/10  Any medication changes, allergies to medications, adverse drug reactions, diagnosis change, or new procedure performed?: [x] No    [] Yes (see summary sheet for update)  Subjective functional status/changes:   [] No changes reported  Pt states the other day he wore hard soled shoes and had some pain due to the back of the shoe rubbing on his heel. OBJECTIVE    Modality rationale: decrease pain and increase tissue extensibility to improve the patients ability to perform ADLs with increased ease.     Min Type Additional Details    [] Estim:  []Unatt       []IFC  []Premod                        []Other:  []w/ice   []w/heat  Position:  Location:    [] Estim: []Att    []TENS instruct  []NMES                    []Other:  []w/US   []w/ice   []w/heat  Position:  Location:    []  Traction: [] Cervical       []Lumbar                       [] Prone          []Supine                       []Intermittent   []Continuous Lbs:  [] before manual  [] after manual   8 [x]  Ultrasound: [x]Continuous   [] Pulsed                           [x]1MHz   []3MHz W/cm2:1.0  Location:left achilles    []  Iontophoresis with dexamethasone         Location: [] Take home patch   [] In clinic    []  Ice     []  heat  []  Ice massage  []  Laser   []  Anodyne Position:  Location:    []  Laser with stim  []  Other:  Position:  Location:    []  Vasopneumatic Device    []  Right     []  Left  Pre-treatment girth:  Post-treatment girth:  Measured at (location):  Pressure:       [] lo [] med [] hi   Temperature: [] lo [] med [] hi   [] Skin assessment post-treatment:  []intact []redness- no adverse reaction []redness  adverse reaction:     10 min Therapeutic Exercise:  [x] See flow sheet :   Rationale: increase ROM and increase strength to improve the patients ability to perform ADLs with increased ease. 9 min Neuromuscular Re-education:  [x]  See flow sheet :   Rationale: increase strength, improve coordination and increase proprioception  to improve the patients ability to perform functional activities with increased ease. 8 min Manual Therapy:  DTM to left gastroc/soleus and achilles with manual DF stretch. The manual therapy interventions were performed at a separate and distinct time from the therapeutic activities interventions. Rationale: decrease pain, increase ROM and increase tissue extensibility to improve tolerance to ADLs. With   [] TE   [] TA   [] neuro   [] other: Patient Education: [x] Review HEP    [] Progressed/Changed HEP based on:   [] positioning   [] body mechanics   [] transfers   [] heat/ice application    [] other:      Other Objective/Functional Measures: increased reps with HR. Pain Level (0-10 scale) post treatment: 0/10    ASSESSMENT/Changes in Function: Pt has no increased pain with treatment. Pt has reports increased flexibility after treatment. Patient will continue to benefit from skilled PT services to modify and progress therapeutic interventions, address functional mobility deficits, address ROM deficits, address strength deficits, analyze and address soft tissue restrictions, analyze and cue movement patterns, analyze and modify body mechanics/ergonomics and assess and modify postural abnormalities to attain remaining goals. []  See Plan of Care  []  See progress note/recertification  []  See Discharge Summary         Progress towards goals / Updated goals:  Short Term Goals: To be accomplished in 2 weeks:               1.  The patient will demonstrate independence and compliance with HEP to maximize therapeutic benefit.              IE: issued HEP               XTGHLKX: reports compliance and improvement in symptoms (2/21/2022)               2. The patient will improve gastroc flexibility to 10 degrees B ankles to improve ease of ADls.             XN: Neutral              ZVCALGU: improving - 2-23-22  Long Term Goals: To be accomplished in 4 weeks:               1. The patient will improve FOTO score to 76 to improve ease of ADLs.             BERNAL: 29               9. The patient will demonstrate stair negotiation void of pain in order to improve ease of negotiating stairs to dwelling.              IE: pain upon descending stairs    Current: Pt reports decreased pain when descending stairs. 03/07/2022               3. The patient will report performing walking program without increase in pain to improve ease of recreational activity.             VM: currently has pain walking   Current: Pt reports improved symptoms but continues to have soreness in ankle at the end of the day. 03/07/2022               4.  The patient will demonstrate negative tenderness through left achilles tendon insertion to improve ease of ambulation.              IE: painful with palpation    PLAN  []  Upgrade activities as tolerated     [x]  Continue plan of care  []  Update interventions per flow sheet       []  Discharge due to:_  []  Other:_      Ellen Cooney PTA 3/7/2022  4:52 PM    Future Appointments   Date Time Provider Carla Abdul   3/9/2022  3:45 PM Monisha Decker PTA [facility]   3/16/2022  5:30 PM Colletta Patch, PT [facility]   3/22/2022  3:40 PM Dana Linda MD [facility]

## 2022-03-09 ENCOUNTER — HOSPITAL ENCOUNTER (OUTPATIENT)
Dept: PHYSICAL THERAPY | Age: 57
Discharge: HOME | End: 2022-03-09
Attending: ORTHOPAEDIC SURGERY
Payer: COMMERCIAL

## 2022-03-09 PROCEDURE — 97112 NEUROMUSCULAR REEDUCATION: CPT

## 2022-03-09 PROCEDURE — 97140 MANUAL THERAPY 1/> REGIONS: CPT

## 2022-03-09 PROCEDURE — 97110 THERAPEUTIC EXERCISES: CPT

## 2022-03-09 NOTE — PROGRESS NOTES
PT DAILY TREATMENT NOTE     Patient Name: Po Talley  Date:3/9/2022  : 1965  [x]  Patient  Verified  Payor: Norma Lopez / [place]: [place] [employer] / Product Type: PPO /    In time:3:45  Out time:4:16  Total Treatment Time (min): 31  Visit #: 7 of 8    Treatment Area: Right ankle pain [M25.571]    SUBJECTIVE  Pain Level (0-10 scale): 2/10  Any medication changes, allergies to medications, adverse drug reactions, diagnosis change, or new procedure performed?: [x] No    [] Yes (see summary sheet for update)  Subjective functional status/changes:   [] No changes reported  \"A little sore. \"    OBJECTIVE    15 min Therapeutic Exercise:  [x] See flow sheet :   Rationale: increase ROM and increase strength to improve the patients ability to perform ADL's.    8 min Neuromuscular Re-education:  [x]  See flow sheet : Hip 3-way on airex, squats, DL RDL's. Rationale: increase strength, improve coordination, improve balance and increase proprioception  to improve the patients ability to perform functional activities. 8 min Manual Therapy:  Graston technique to Left gastroc, soleus and achilles tendon. Pt prone. The manual therapy interventions were performed at a separate and distinct time from the therapeutic activities interventions. Rationale: decrease pain, increase ROM, increase tissue extensibility and decrease trigger points to improve walking/standing tolerance. With   [x] TE   [] TA   [] neuro   [] other: Patient Education: [x] Review HEP    [] Progressed/Changed HEP based on:   [] positioning   [] body mechanics   [] transfers   [] heat/ice application    [] other:      Other Objective/Functional Measures: Minor cueing to correct exercise mechanics/form. Pain Level (0-10 scale) post treatment: 0/10    ASSESSMENT/Changes in Function: Pt fully participated in treatment and is motivated.  Tolerated Graston treatment well, noted petechiae along medial aspect of achilles tendon and distal soleus. Continue PT to decrease mm/fascia restrictions to improve walking/standing tolerance     Patient will continue to benefit from skilled PT services to modify and progress therapeutic interventions, address functional mobility deficits, address ROM deficits, address strength deficits, analyze and address soft tissue restrictions, analyze and cue movement patterns and analyze and modify body mechanics/ergonomics to attain remaining goals. [x]  See Plan of Care  []  See progress note/recertification  []  See Discharge Summary         Progress towards goals / Updated goals:  Short Term Goals: To be accomplished in 2 weeks:               1. The patient will demonstrate independence and compliance with HEP to maximize therapeutic benefit.              IE: issued HEP               BXVTVNZ: reports compliance and improvement in symptoms (2/21/2022)               2. The patient will improve gastroc flexibility to 10 degrees B ankles to improve ease of ADls.             CG: Neutral              DTJMIUM: improving - 2-23-22  Long Term Goals: To be accomplished in 4 weeks:               1. The patient will improve FOTO score to 76 to improve ease of ADLs.             NP: 31               7. The patient will demonstrate stair negotiation void of pain in order to improve ease of negotiating stairs to dwelling.              IE: pain upon descending stairs               Current: Pt reports decreased pain when descending stairs. 03/07/2022               3. The patient will report performing walking program without increase in pain to improve ease of recreational activity.             HJ: currently has pain walking              Current: Pt reports improved symptoms but continues to have soreness in ankle at the end of the day. 03/07/2022               4.  The patient will demonstrate negative tenderness through left achilles tendon insertion to improve ease of ambulation.              IE: painful with palpation PLAN  []  Upgrade activities as tolerated     [x]  Continue plan of care  []  Update interventions per flow sheet       []  Discharge due to:_  []  Other:_      Melinda Daughertykarolina, PTA 3/9/2022  3:44 PM    Future Appointments   Date Time Provider Carla Abdul   3/9/2022  3:45 PM Betsy , [facility]   3/16/2022  5:30 PM [facility]   3/22/2022  3:40 PM Christine Linda MD [facility]

## 2022-03-14 ENCOUNTER — APPOINTMENT (OUTPATIENT)
Dept: PHYSICAL THERAPY | Age: 57
End: 2022-03-14
Attending: ORTHOPAEDIC SURGERY
Payer: COMMERCIAL

## 2022-03-16 ENCOUNTER — HOSPITAL ENCOUNTER (OUTPATIENT)
Dept: PHYSICAL THERAPY | Age: 57
Discharge: HOME | End: 2022-03-16
Attending: ORTHOPAEDIC SURGERY
Payer: COMMERCIAL

## 2022-03-16 PROCEDURE — 97112 NEUROMUSCULAR REEDUCATION: CPT

## 2022-03-16 PROCEDURE — 97110 THERAPEUTIC EXERCISES: CPT

## 2022-03-16 PROCEDURE — 97140 MANUAL THERAPY 1/> REGIONS: CPT

## 2022-03-16 NOTE — PROGRESS NOTES
PT DAILY TREATMENT NOTE     Patient Name: Philippe Oropeza  Date:3/16/2022  : 1965  [x]  Patient  Verified  Payor: Karan Kawasaki / [place]: [place] [employer] [place] / Product Type: PPO /    In time:530  Out time:6:08  Total Treatment Time (min): 38  Visit #: 8 of 8    Treatment Area: Right ankle pain [M25.571]    SUBJECTIVE  Pain Level (0-10 scale): 0  Any medication changes, allergies to medications, adverse drug reactions, diagnosis change, or new procedure performed?: [x] No    [] Yes (see summary sheet for update)  Subjective functional status/changes:   [] No changes reported  The pt reports he feels Graston has been helping. He does reports some increased stiffness and pain over the weekend with increased walking. OBJECTIVE      18 min Therapeutic Exercise:  [x] See flow sheet :   Rationale: increase ROM and increase strength to improve the patients ability to perform ADL       12 min Neuromuscular Re-education:  []  See flow sheet :   Rationale: increase strength, improve coordination and increase proprioception  to improve the patients ability to perform ADl    8 min Manual Therapy:  Pt prone performed Graston with GT 4 and 6 to gastroc and achilles respectively   The manual therapy interventions were performed at a separate and distinct time from the therapeutic activities interventions. Rationale: decrease pain, increase ROM and increase tissue extensibility to perfomr ADL            With   [] TE   [] TA   [] neuro   [] other: Patient Education: [x] Review HEP    [] Progressed/Changed HEP based on:   [] positioning   [] body mechanics   [] transfers   [] heat/ice application    [] other:      Other Objective/Functional Measures: see PN     Pain Level (0-10 scale) post treatment: 0    ASSESSMENT/Changes in Function:  The pt is progressing with PT. He demonstrates improved AROM and gastroc flexibility and diminished overall pain. FOTO score regressed but pt reports improvement for his daily activities.  The pt will benefit from continuation of PT to further his current progress. Patient will continue to benefit from skilled PT services to modify and progress therapeutic interventions, address functional mobility deficits, address ROM deficits, address strength deficits, analyze and address soft tissue restrictions and analyze and cue movement patterns to attain remaining goals. []  See Plan of Care  [x]  See progress note/recertification  []  See Discharge Summary         Progress towards goals / Updated goals:  Short Term Goals: To be accomplished in 2 weeks:               1. The patient will demonstrate independence and compliance with HEP to maximize therapeutic benefit.              IE: issued HEP               PBQHWXQ: reports compliance and improvement in symptoms (2/21/2022)               2. The patient will improve gastroc flexibility to 10 degrees B ankles to improve ease of ADls.             YW: Neutral              WTGZCZE: improving 5 degrees on 3-16-22   Long Term Goals: To be accomplished in 4 weeks:               1. The patient will improve FOTO score to 76 to improve ease of ADLs.             PH: 58   Current: regressed to 60 on 3-16-22               2. The patient will demonstrate stair negotiation void of pain in order to improve ease of negotiating stairs to dwelling.              IE: pain upon descending stairs               Current: Pt reports intermittent pain (improved) 3-16-22               3. The patient will report performing walking program without increase in pain to improve ease of recreational activity.             VW: currently has pain walking               Current: Pt reports improved symptoms but continues to have soreness in ankle at the end of the day. 03/07/2022               4.  The patient will demonstrate negative tenderness through left achilles tendon insertion to improve ease of ambulation.              IE: painful with palpation    Current: minor tenderness on 3-16-22    PLAN  []  Upgrade activities as tolerated     [x]  Continue plan of care  []  Update interventions per flow sheet       []  Discharge due to:_  []  Other:_      Gasper Elias, PT 3/16/2022  5:29 PM    Future Appointments   Date Time Provider Carla Abdul   3/16/2022  5:30 PM Ashley Ray, PT [facility]   6/23/2022  8:20 AM Irene Linda, MD [facility]

## 2022-03-16 NOTE — PROGRESS NOTES
[facility]  [address] [facility] [address] [facility] [address]  [place], [address]  [phone] fax    Physical Therapy Progress Note  Patient name: Richard Juares Start of Care: 2022   Referral source: Abisai Loomis MD : 1965                Medical Diagnosis: Right ankle pain [M25.571]  Payor: Kelle Boss / Plan: [place] [employer] PPO / Product Type: PPO /  Onset Date:8 months ago                Treatment Diagnosis: Left achilles tendonopathy   Prior Hospitalization: see medical history Provider#: [id number]   Medications: Verified on Patient summary List    Comorbidities: HTN, Gall bladder removal, Carpal tunnel release, Trigger Finger surgery, BMI > 30   Prior Level of Function: The patient states he had improved ease of ambulation prior to onset. Visits from Start of Care: 8    Missed Visits: 0      Key Functional Changes: The pt is showing progress with PT and will benefit from continuation. The pt is progressing with PT. He demonstrates improved AROM and gastroc flexibility and diminished overall pain. FOTO score regressed but pt reports improvement for his daily activities. The pt will benefit from continuation of PT to further his current progress. Progress towards goals   Short Term Goals: To be accomplished in 2 weeks:               1. The patient will demonstrate independence and compliance with HEP to maximize therapeutic benefit.              IE: issued HEP               [id number]: reports compliance and improvement in symptoms (2022)               2. The patient will improve gastroc flexibility to 10 degrees B ankles to improve ease of ADls.             KJ: Neutral              WBYBVTC: improving 5 degrees on 3-16-22   Long Term Goals: To be accomplished in 4 weeks:               1. The patient will improve FOTO score to 76 to improve ease of ADLs.             Z   Current: regressed to 60 on 3-16-22               2.  The patient will demonstrate stair negotiation void of pain in order to improve ease of negotiating stairs to dwelling.              IE: pain upon descending stairs               Current: Pt reports intermittent pain (improved) 3-16-22               3. The patient will report performing walking program without increase in pain to improve ease of recreational activity.             MW: currently has pain walking               Current: Pt reports improved symptoms but continues to have soreness in ankle at the end of the day. 03/07/2022               4. The patient will demonstrate negative tenderness through left achilles tendon insertion to improve ease of ambulation.              IE: painful with palpation    Current: minor tenderness on 3-16-22      Updated Goals: to be achieved in 3 weeks:    1. The patient will improve FOTO score to 76 to improve ease of ADLs.               PN: 60               2. The patient will demonstrate stair negotiation void of pain in order to improve ease of negotiating stairs to dwelling.               PN: intermittent pain (improved)                3. The patient will report performing walking program without increase in pain to improve ease of recreational activity.              PN: Pt reports improved symptoms but continues to have soreness in ankle at the end of the day. 03/07/2022               4.  The patient will demonstrate negative tenderness through left achilles tendon insertion to improve ease of ambulation.                PN: minor tenderness     ASSESSMENT/RECOMMENDATIONS:  [x]Continue therapy per initial plan/protocol at a frequency of  2 x per week for 3 weeks  []Continue therapy with the following recommended changes:_____________________      _____________________________________________________________________  []Discontinue therapy progressing towards or have reached established goals  []Discontinue therapy due to lack of appreciable progress towards goals  []Discontinue therapy due to lack of attendance or compliance  []Await Physician's recommendations/decisions regarding therapy  []Other:________________________________________________________________    Thank you for this referral.    Diamante Salinas, PT 3/16/2022 5:36 PM  NOTE TO PHYSICIAN:  PLEASE COMPLETE THE ORDERS BELOW AND   FAX TO [facility]: [phone]  If you are unable to process this request in 24 hours please contact our office: [phone]    ? I have read the above report and request that my patient continue as recommended. ? I have read the above report and request that my patient continue therapy with the following changes/special instructions:__________________________________________________________  ? I have read the above report and request that my patient be discharged from therapy.     Physicians signature: ______________________________Date: ______Time:______     Maria D Archibald MD

## 2022-03-24 DIAGNOSIS — I10 ESSENTIAL HYPERTENSION: ICD-10-CM

## 2022-03-24 DIAGNOSIS — R73.03 PREDIABETES: ICD-10-CM

## 2022-03-24 DIAGNOSIS — E78.5 DYSLIPIDEMIA: ICD-10-CM

## 2022-03-24 DIAGNOSIS — Z12.5 PROSTATE CANCER SCREENING: ICD-10-CM

## 2022-03-24 DIAGNOSIS — E55.9 VITAMIN D DEFICIENCY: ICD-10-CM

## 2022-03-31 ENCOUNTER — TELEPHONE (OUTPATIENT)
Dept: PHYSICAL THERAPY | Age: 57
End: 2022-03-31

## 2022-04-06 RX ORDER — LOSARTAN POTASSIUM 100 MG/1
TABLET ORAL
Qty: 90 TABLET | Refills: 3 | Status: SHIPPED | OUTPATIENT
Start: 2022-04-06 | End: 2022-11-01 | Stop reason: SDUPTHER

## 2022-04-08 ENCOUNTER — TELEPHONE (OUTPATIENT)
Dept: PHYSICAL THERAPY | Age: 57
End: 2022-04-08

## 2022-07-02 RX ORDER — ATENOLOL 50 MG/1
TABLET ORAL
Qty: 90 TABLET | Refills: 1 | Status: SHIPPED | OUTPATIENT
Start: 2022-07-02 | End: 2022-11-01 | Stop reason: SDUPTHER

## 2022-07-16 ENCOUNTER — HOSPITAL ENCOUNTER (OUTPATIENT)
Dept: LAB | Age: 57
Discharge: HOME | End: 2022-07-16

## 2022-07-16 LAB — SENTARA SPECIMEN COL,SENBCF: NORMAL

## 2022-07-16 PROCEDURE — 99001 SPECIMEN HANDLING PT-LAB: CPT

## 2022-07-17 LAB
25(OH)D3 SERPL-MCNC: 33.8 NG/ML (ref 32–100)
A-G RATIO,AGRAT: 1.9 RATIO (ref 1.1–2.6)
ALBUMIN SERPL-MCNC: 4.2 G/DL (ref 3.5–5)
ALP SERPL-CCNC: 75 U/L (ref 25–115)
ALT SERPL-CCNC: 29 U/L (ref 5–40)
ANION GAP SERPL CALC-SCNC: 12 MMOL/L (ref 3–15)
AST SERPL W P-5'-P-CCNC: 25 U/L (ref 10–37)
AVG GLU, 10930: 145 MG/DL (ref 91–123)
BILIRUB SERPL-MCNC: 0.3 MG/DL (ref 0.2–1.2)
BUN SERPL-MCNC: 20 MG/DL (ref 6–22)
CALCIUM SERPL-MCNC: 9.1 MG/DL (ref 8.4–10.5)
CHLORIDE SERPL-SCNC: 104 MMOL/L (ref 98–110)
CHOLEST SERPL-MCNC: 285 MG/DL (ref 110–200)
CO2 SERPL-SCNC: 23 MMOL/L (ref 20–32)
CREAT SERPL-MCNC: 1 MG/DL (ref 0.5–1.2)
CREATININE, URINE: 176 MG/DL
GLOBULIN,GLOB: 2.2 G/DL (ref 2–4)
GLOMERULAR FILTRATION RATE: >60 ML/MIN/1.73 SQ.M.
GLUCOSE SERPL-MCNC: 147 MG/DL (ref 70–99)
HBA1C MFR BLD HPLC: 6.7 % (ref 4.8–5.6)
HDLC SERPL-MCNC: 10.2 MG/DL (ref 0–5)
HDLC SERPL-MCNC: 28 MG/DL
LDL, DIRECT,DLDL: 121 MG/DL (ref 50–99)
LDL/HDL RATIO,LDHD: 4.3
LDLC SERPL CALC-MCNC: ABNORMAL MG/DL
MICROALB/CREAT RATIO, 140286: 13.5 (ref 0–30)
MICROALBUMIN,URINE RANDOM 140054: 23.7 MG/L (ref 0.1–17)
NON-HDL CHOLESTEROL, 011976: ABNORMAL
POTASSIUM SERPL-SCNC: 4.7 MMOL/L (ref 3.5–5.5)
PROT SERPL-MCNC: 6.4 G/DL (ref 6.4–8.3)
PSA SERPL-MCNC: 0.56 NG/ML
SODIUM SERPL-SCNC: 139 MMOL/L (ref 133–145)
TRIGL SERPL-MCNC: 836 MG/DL (ref 40–149)
VLDLC SERPL CALC-MCNC: ABNORMAL MG/DL

## 2022-07-20 ENCOUNTER — OFFICE VISIT (OUTPATIENT)
Dept: INTERNAL MEDICINE CLINIC | Age: 57
End: 2022-07-20
Payer: COMMERCIAL

## 2022-07-20 VITALS
TEMPERATURE: 96.9 F | SYSTOLIC BLOOD PRESSURE: 172 MMHG | DIASTOLIC BLOOD PRESSURE: 95 MMHG | BODY MASS INDEX: 45.53 KG/M2 | OXYGEN SATURATION: 97 % | WEIGHT: 300.4 LBS | HEART RATE: 60 BPM | HEIGHT: 68 IN

## 2022-07-20 DIAGNOSIS — E11.65 TYPE 2 DIABETES MELLITUS WITH HYPERGLYCEMIA, WITHOUT LONG-TERM CURRENT USE OF INSULIN (HCC): ICD-10-CM

## 2022-07-20 DIAGNOSIS — I10 ESSENTIAL HYPERTENSION: Primary | ICD-10-CM

## 2022-07-20 DIAGNOSIS — E66.01 MORBID OBESITY (HCC): ICD-10-CM

## 2022-07-20 DIAGNOSIS — E55.9 VITAMIN D DEFICIENCY: ICD-10-CM

## 2022-07-20 DIAGNOSIS — E78.5 DYSLIPIDEMIA: ICD-10-CM

## 2022-07-20 PROCEDURE — 3044F HG A1C LEVEL LT 7.0%: CPT | Performed by: INTERNAL MEDICINE

## 2022-07-20 PROCEDURE — 99214 OFFICE O/P EST MOD 30 MIN: CPT | Performed by: INTERNAL MEDICINE

## 2022-07-20 RX ORDER — METFORMIN HYDROCHLORIDE 750 MG/1
750 TABLET, EXTENDED RELEASE ORAL
Qty: 90 TABLET | Refills: 1 | Status: SHIPPED | OUTPATIENT
Start: 2022-07-20 | End: 2022-11-01

## 2022-07-20 RX ORDER — NAPROXEN 500 MG/1
500 TABLET ORAL 2 TIMES DAILY
COMMUNITY
Start: 2022-07-17 | End: 2022-11-03

## 2022-07-20 RX ORDER — PREDNISONE 10 MG/1
10 TABLET ORAL DAILY
COMMUNITY
Start: 2022-07-17 | End: 2022-07-25

## 2022-07-20 RX ORDER — FLUTICASONE PROPIONATE 50 MCG
2 SPRAY, SUSPENSION (ML) NASAL DAILY
Qty: 1 EACH | Refills: 5 | Status: SHIPPED | OUTPATIENT
Start: 2022-07-20

## 2022-07-20 NOTE — PROGRESS NOTES
Subjective:       Chief Complaint  The patient presents for follow up of hypertension and high cholesterol. Obesity, vit D deficiency         HPI  Rishabh Pereira is a 62 y.o. male seen for follow up of hyperlipidemia. Healso has hypertension. Hypertension uncontrolled, on Tenormin 50 mg and Cozaar 100 mg. Patient continues to struggle to lose weight and keep it off. Pt missed a few days of his BP meds and fell and fractured anterior coccyx. hyperlipidemia poorly controlled patient has had difficulty tolerating statinseven Crestor 5 mg 3 times a week with taking co-Q10, patient's going to try again to work on losing more weight to improve further. .  he will continue to work on decreasing his BMI of 45 by cutting back sugar and starches in his diet. He did a Heart Scan 10/2020 and his CAC score is 120. Diet and Lifestyle: generally follows a low fat low cholesterol diet, exercises sporadically    Home BP Monitoring: is not measured at home. Other symptoms and concerns: Pt's vit D level is  well controlled on vit D 2000 units/day. Patient has developed DM that is now uncontrolled. Will start him on metformin and continue to work on cutting back sugar and starches in his diet. Patient has severe muscles spasms with exercise and will try to increase taking Power Aide. If he does not improve will consider potassium supplementation or refer to Neurology for further evaluation. Current Outpatient Medications   Medication Sig    naproxen (NAPROSYN) 500 mg tablet Take 500 mg by mouth two (2) times a day. predniSONE (DELTASONE) 10 mg tablet Take 10 mg by mouth in the morning. metFORMIN ER (GLUCOPHAGE XR) 750 mg tablet Take 1 Tablet by mouth daily (with dinner). fluticasone propionate (FLONASE) 50 mcg/actuation nasal spray Take 2 Sprays by Both Nostrils route in the morning.     atenoloL (TENORMIN) 50 mg tablet TAKE 1 TABLET BY MOUTH DAILY    losartan (COZAAR) 100 mg tablet TAKE 1 TABLET BY MOUTH EVERY DAY    ergocalciferol, vitamin D2, (VITAMIN D2 PO) Take  by mouth. docosahexaenoic acid/epa (FISH OIL PO) Take  by mouth.    multivitamin (ONE A DAY) tablet Take 1 Tab by mouth daily. famotidine (PEPCID) 40 mg tablet Take 1 Tablet by mouth daily. (Patient not taking: Reported on 7/20/2022)     No current facility-administered medications for this visit. Review of Systems  Respiratory: negative for dyspnea on exertion  Cardiovascular: negative for chest pain    Objective:     Visit Vitals  BP (!) 172/95   Pulse 60   Temp 96.9 °F (36.1 °C)   Ht 5' 8\" (1.727 m)   Wt 300 lb 6.4 oz (136.3 kg)   SpO2 97%   BMI 45.68 kg/m²        General appearance - alert, well appearing, and in no distress  Neck - supple, no significant adenopathy, carotids upstroke normal bilaterally, no bruits  Chest - clear to auscultation, no wheezes, rales or rhonchi, symmetric air entry  Heart - normal rate, regular rhythm, normal S1, S2, no murmurs, rubs, clicks or gallops  Extremities - peripheral pulses normal, no pedal edema, no clubbing or cyanosis  Skin - normal coloration and turgor, no rashes, no suspicious skin lesions noted      Labs:      Labs:   Lab Results   Component Value Date/Time    Cholesterol, total 285 (H) 07/16/2022 09:00 AM    HDL Cholesterol 28 (L) 07/16/2022 09:00 AM    LDL,Direct 121 (H) 07/16/2022 09:00 AM    LDL, calculated  07/16/2022 09:00 AM      Comment:      Triglyceride value is too high to calculate LDL. Chantell Valderrama Direct LDL is being  performed.         Triglyceride 836 (H) 07/16/2022 09:00 AM     Lab Results   Component Value Date/Time    Sodium 139 07/16/2022 09:00 AM    Potassium 4.7 07/16/2022 09:00 AM    Chloride 104 07/16/2022 09:00 AM    CO2 23 07/16/2022 09:00 AM    Anion gap 12.0 07/16/2022 09:00 AM    Glucose 147 (H) 07/16/2022 09:00 AM    BUN 20 07/16/2022 09:00 AM    Creatinine 1.0 07/16/2022 09:00 AM    BUN/Creatinine ratio 18 06/29/2015 07:51 AM    GFR est  06/29/2015 07:51 AM GFR est non- 06/29/2015 07:51 AM    Calcium 9.1 07/16/2022 09:00 AM    Bilirubin, total 0.3 07/16/2022 09:00 AM    ALT (SGPT) 29 07/16/2022 09:00 AM    Alk. phosphatase 75 07/16/2022 09:00 AM    Protein, total 6.4 07/16/2022 09:00 AM    Albumin 4.2 07/16/2022 09:00 AM    Globulin 2.2 07/16/2022 09:00 AM    A-G Ratio 1.9 07/16/2022 09:00 AM      Lab Results   Component Value Date/Time    Hemoglobin A1c 6.7 (H) 07/16/2022 09:00 AM        Labs:   Lab Results   Component Value Date/Time    Prostate Specific Ag 0.557 07/16/2022 09:00 AM    Prostate Specific Ag 0.520 09/18/2020 09:10 AM    Prostate Specific Ag 0.601 02/27/2017 04:22 PM           Assessment / Plan     Hypertension uncontrolled on Tenormin 50 mg daily and cozaar 100 mg daily. AIM is SBP<130. If not improving with weight loss and better compliance will need to adjust meds. Hyperlipidemia has been difficult to control due to patient's intolerance of statins. He did not tolerate even Crestor 5 mg 3 times a week with co-Q10 and omega-3 fatty acids. He will continue to work on trying to improve diet and losing weight. .  Patient had a coronary artery calcium score elevated at 120 in the past.      ICD-10-CM ICD-9-CM    1. Essential hypertension  K14 566.7 METABOLIC PANEL, COMPREHENSIVE      2. Dyslipidemia  E78.5 272.4 LIPID PANEL      3. Type 2 diabetes mellitus with hyperglycemia, without long-term current use of insulin (HCC)  E11.65 250.00 Uncontrolled. Will start metFORMIN ER (GLUCOPHAGE XR) 750 mg tablet and continue to work on diet and weight loss      790.29 HEMOGLOBIN A1C W/O EAG      4. Morbid obesity ([address]  E66.01 278.01 Pt will work on cutting back starches and sugar in his diet. 5. Vitamin D deficiency  E55.9 268.9 Stable on current supplementation VITAMIN D, 25 HYDROXY                    Low cholesterol diet, weight control and daily exercise discussed.      Follow-up and Dispositions    Return in about 3 months (around 10/20/2022) for labs 1 week before. Reviewed plan of care. Patient has provided input and agrees with goals.

## 2022-07-20 NOTE — PROGRESS NOTES
Crow Darby is a 62 y.o. male (: 1965) presenting to address:    No chief complaint on file. There were no vitals filed for this visit. Hearing/Vision:   No results found. Learning Assessment:     Learning Assessment 2018   PRIMARY LEARNER Patient   HIGHEST LEVEL OF EDUCATION - PRIMARY LEARNER  4 YEARS OF COLLEGE   BARRIERS PRIMARY LEARNER NONE   CO-LEARNER CAREGIVER No   PRIMARY LANGUAGE ENGLISH   LEARNER PREFERENCE PRIMARY LISTENING   ANSWERED BY pt   RELATIONSHIP SELF     Depression Screening:     3 most recent PHQ Screens 2021   Little interest or pleasure in doing things Not at all   Feeling down, depressed, irritable, or hopeless Not at all   Total Score PHQ 2 0     Fall Risk Assessment:     Fall Risk Assessment, last 12 mths 10/7/2020   Able to walk? Yes   Fall in past 12 months? No     Abuse Screening:     Abuse Screening Questionnaire 2021   Do you ever feel afraid of your partner? N   Are you in a relationship with someone who physically or mentally threatens you? N   Is it safe for you to go home? Y     ADL Assessment:   No flowsheet data found. Coordination of Care Questionaire:   1. \"Have you been to the ER, urgent care clinic since your last visit? Hospitalized since your last visit? \" Yes When: [facility] ER DX  FX  [place]oc[place]      2. \"Have you seen or consulted any other health care providers outside of the [address] [place] [address] since your last visit? \" No     3. For patients aged 39-70: Has the patient had a colonoscopy / FIT/ Cologuard? Yes - no Care Gap present      If the patient is female:    4. For patients aged 41-77: Has the patient had a mammogram within the past 2 years? NA - based on age or sex  See top three    5. For patients aged 21-65: Has the patient had a pap smear? NA - based on age or sex    Advanced Directive:   1. Do you have an Advanced Directive? NO    2. Would you like information on Advanced Directives?  NO

## 2022-10-20 DIAGNOSIS — E11.65 TYPE 2 DIABETES MELLITUS WITH HYPERGLYCEMIA, WITHOUT LONG-TERM CURRENT USE OF INSULIN (HCC): ICD-10-CM

## 2022-10-20 DIAGNOSIS — I10 ESSENTIAL HYPERTENSION: ICD-10-CM

## 2022-10-20 DIAGNOSIS — E55.9 VITAMIN D DEFICIENCY: ICD-10-CM

## 2022-10-20 DIAGNOSIS — E78.5 DYSLIPIDEMIA: ICD-10-CM

## 2022-10-22 ENCOUNTER — HOSPITAL ENCOUNTER (OUTPATIENT)
Dept: LAB | Age: 57
Discharge: HOME | End: 2022-10-22

## 2022-10-22 LAB — SENTARA SPECIMEN COL,SENBCF: NORMAL

## 2022-10-22 PROCEDURE — 99001 SPECIMEN HANDLING PT-LAB: CPT

## 2022-10-23 LAB
AVG GLU, 10930: 124 MG/DL (ref 91–123)
CREATININE, URINE: 172 MG/DL
HBA1C MFR BLD HPLC: 5.9 % (ref 4.8–5.6)
MICROALB/CREAT RATIO, 140286: NORMAL
MICROALBUMIN,URINE RANDOM 140054: <12 MG/L (ref 0.1–17)
PSA SERPL-MCNC: 1.79 NG/ML

## 2022-11-01 ENCOUNTER — OFFICE VISIT (OUTPATIENT)
Dept: INTERNAL MEDICINE CLINIC | Age: 57
End: 2022-11-01
Payer: COMMERCIAL

## 2022-11-01 VITALS
OXYGEN SATURATION: 98 % | TEMPERATURE: 98.6 F | RESPIRATION RATE: 20 BRPM | DIASTOLIC BLOOD PRESSURE: 76 MMHG | BODY MASS INDEX: 41.52 KG/M2 | WEIGHT: 274 LBS | SYSTOLIC BLOOD PRESSURE: 118 MMHG | HEART RATE: 63 BPM | HEIGHT: 68 IN

## 2022-11-01 DIAGNOSIS — E66.01 MORBID OBESITY (HCC): ICD-10-CM

## 2022-11-01 DIAGNOSIS — E55.9 VITAMIN D DEFICIENCY: ICD-10-CM

## 2022-11-01 DIAGNOSIS — E78.5 DYSLIPIDEMIA: ICD-10-CM

## 2022-11-01 DIAGNOSIS — E11.65 TYPE 2 DIABETES MELLITUS WITH HYPERGLYCEMIA, WITHOUT LONG-TERM CURRENT USE OF INSULIN (HCC): Primary | ICD-10-CM

## 2022-11-01 DIAGNOSIS — I10 ESSENTIAL HYPERTENSION: ICD-10-CM

## 2022-11-01 PROCEDURE — 3074F SYST BP LT 130 MM HG: CPT | Performed by: INTERNAL MEDICINE

## 2022-11-01 PROCEDURE — 3044F HG A1C LEVEL LT 7.0%: CPT | Performed by: INTERNAL MEDICINE

## 2022-11-01 PROCEDURE — 3078F DIAST BP <80 MM HG: CPT | Performed by: INTERNAL MEDICINE

## 2022-11-01 PROCEDURE — 99214 OFFICE O/P EST MOD 30 MIN: CPT | Performed by: INTERNAL MEDICINE

## 2022-11-01 RX ORDER — LOSARTAN POTASSIUM 100 MG/1
100 TABLET ORAL DAILY
Qty: 90 TABLET | Refills: 2 | Status: SHIPPED | OUTPATIENT
Start: 2022-11-01

## 2022-11-01 RX ORDER — ATENOLOL 50 MG/1
50 TABLET ORAL DAILY
Qty: 90 TABLET | Refills: 2 | Status: SHIPPED | OUTPATIENT
Start: 2022-11-01

## 2022-11-01 NOTE — PROGRESS NOTES
Patient is in the office today for a 3 month follow up. 1. \"Have you been to the ER, urgent care clinic since your last visit? Hospitalized since your last visit? \"  No    2. \"Have you seen or consulted any other health care providers outside of the [address] [place] [address] since your last visit? \" No     3. For patients aged 39-70: Has the patient had a colonoscopy / FIT/ Cologuard? Yes - no Care Gap present      If the patient is female:    4. For patients aged 41-77: Has the patient had a mammogram within the past 2 years? NA - based on age or sex      11. For patients aged 21-65: Has the patient had a pap smear?  NA - based on age or sex

## 2022-11-03 NOTE — PROGRESS NOTES
Subjective:       Chief Complaint  The patient presents for follow up of hypertension and high cholesterol. Obesity, vit D deficiency         HPI  Km Warren is a 62 y.o. male seen for follow up of hyperlipidemia. Raj has hypertension. Hypertension well controlled, on Tenormin 50 mg and Cozaar 100 mg. Patient continues to struggle to lose weight and keep it off. . hyperlipidemia poorly controlled in the past, patient has had difficulty tolerating statins even Crestor 5 mg 3 times a week with taking co-Q10, patient's going to try again to work on losing more weight to improve further. .  he will continue to work on decreasing his BMI of 41 by cutting back sugar and starches in his diet. He did a Heart Scan 10/2020 and his CAC score is 120. The 10-year ASCVD risk score (Nadira BREAUX, et al., 2019) is: 27.6%  Patient was diagnosed with diabetes and started on metformin but instead of taking it he worked with his wife to lose weight and is trying to exercise. He did decrease his A1c from 6.7-5.9. At this point we will continue off of metformin. Diet and Lifestyle: generally follows a low fat low cholesterol diet, follows a diabetic diet regularly, exercises sporadically    Home BP Monitoring: is not measured at home. Other symptoms and concerns: Pt's vit D level is  well controlled on vit D 2000 units/day. Current Outpatient Medications   Medication Sig    atenoloL (TENORMIN) 50 mg tablet Take 1 Tablet by mouth daily. losartan (COZAAR) 100 mg tablet Take 1 Tablet by mouth daily. fluticasone propionate (FLONASE) 50 mcg/actuation nasal spray Take 2 Sprays by Both Nostrils route in the morning.    ergocalciferol, vitamin D2, (VITAMIN D2 PO) Take  by mouth. docosahexaenoic acid/epa (FISH OIL PO) Take  by mouth.    multivitamin (ONE A DAY) tablet Take 1 Tab by mouth daily. No current facility-administered medications for this visit.              Review of Systems  Respiratory: negative for dyspnea on exertion  Cardiovascular: negative for chest pain    Objective:     Visit Vitals  /76 (BP 1 Location: Left arm, BP Patient Position: Sitting, BP Cuff Size: Adult)   Pulse 63   Temp 98.6 °F (37 °C) (Temporal)   Resp 20   Ht 5' 8\" (1.727 m)   Wt 274 lb (124.3 kg)   SpO2 98%   BMI 41.66 kg/m²        General appearance - alert, well appearing, and in no distress  Neck - supple, no significant adenopathy, carotids upstroke normal bilaterally, no bruits  Chest - clear to auscultation, no wheezes, rales or rhonchi, symmetric air entry  Heart - normal rate, regular rhythm, normal S1, S2, no murmurs, rubs, clicks or gallops  Extremities - peripheral pulses normal, no pedal edema, no clubbing or cyanosis  Skin - normal coloration and turgor, no rashes, no suspicious skin lesions noted      Labs:      Labs:   Lab Results   Component Value Date/Time    Cholesterol, total 285 (H) 07/16/2022 09:00 AM    HDL Cholesterol 28 (L) 07/16/2022 09:00 AM    LDL,Direct 121 (H) 07/16/2022 09:00 AM    LDL, calculated  07/16/2022 09:00 AM      Comment:      Triglyceride value is too high to calculate LDL. Krystal Weems Direct LDL is being  performed. Triglyceride 836 (H) 07/16/2022 09:00 AM     Lab Results   Component Value Date/Time    Sodium 139 07/16/2022 09:00 AM    Potassium 4.7 07/16/2022 09:00 AM    Chloride 104 07/16/2022 09:00 AM    CO2 23 07/16/2022 09:00 AM    Anion gap 12.0 07/16/2022 09:00 AM    Glucose 147 (H) 07/16/2022 09:00 AM    BUN 20 07/16/2022 09:00 AM    Creatinine 1.0 07/16/2022 09:00 AM    BUN/Creatinine ratio 18 06/29/2015 07:51 AM    GFR est  06/29/2015 07:51 AM    GFR est non- 06/29/2015 07:51 AM    Calcium 9.1 07/16/2022 09:00 AM    Bilirubin, total 0.3 07/16/2022 09:00 AM    ALT (SGPT) 29 07/16/2022 09:00 AM    Alk.  phosphatase 75 07/16/2022 09:00 AM    Protein, total 6.4 07/16/2022 09:00 AM    Albumin 4.2 07/16/2022 09:00 AM    Globulin 2.2 07/16/2022 09:00 AM    A-G Ratio 1.9 07/16/2022 09:00 AM      Lab Results   Component Value Date/Time    Hemoglobin A1c 5.9 (H) 10/22/2022 06:34 AM        Labs:   Lab Results   Component Value Date/Time    Prostate Specific Ag 1.790 10/22/2022 06:34 AM    Prostate Specific Ag 0.557 07/16/2022 09:00 AM    Prostate Specific Ag 0.520 09/18/2020 09:10 AM           Assessment / Plan     Hypertension well controlled on Tenormin 50 mg daily and cozaar 100 mg daily along with recent weight loss. Hyperlipidemia has been difficult to control due to patient's intolerance of statins. He did not tolerate even Crestor 5 mg 3 times a week with co-Q10 and omega-3 fatty acids. He will continue to work on trying to improve diet and losing weight. .  Patient had a coronary artery calcium score elevated at 120 in the past.      ICD-10-CM ICD-9-CM    1. Type 2 diabetes mellitus with hyperglycemia, without long-term current use of insulin (HCC)  E11.65 250.00 Controlled currently with weight loss we will continue to monitor HEMOGLOBIN A1C W/O EAG     790.29       2. Essential hypertension  E96 833.5 METABOLIC PANEL, COMPREHENSIVE      3. Dyslipidemia  E78.5 272.4 LIPID PANEL      4. Morbid obesity (HCC)  E66.01 278.01 Patient encouraged to continue to cut back starches and sugars in his diet and increase physical activity      5. Vitamin D deficiency  E55.9 268.9 Well-controlled current supplementation VITAMIN D, 25 HYDROXY                    Low cholesterol diet, weight control and daily exercise discussed. Follow-up and Dispositions    Return in about 4 months (around 3/1/2023) for labs 1 week before. Reviewed plan of care. Patient has provided input and agrees with goals.

## 2023-02-03 DIAGNOSIS — E11.65 TYPE 2 DIABETES MELLITUS WITH HYPERGLYCEMIA, WITHOUT LONG-TERM CURRENT USE OF INSULIN (HCC): ICD-10-CM

## 2023-02-03 DIAGNOSIS — E55.9 VITAMIN D DEFICIENCY: ICD-10-CM

## 2023-02-03 DIAGNOSIS — I10 ESSENTIAL HYPERTENSION: ICD-10-CM

## 2023-02-03 DIAGNOSIS — E78.5 DYSLIPIDEMIA: ICD-10-CM

## 2023-03-06 ENCOUNTER — TELEPHONE (OUTPATIENT)
Age: 58
End: 2023-03-06

## 2023-03-06 NOTE — TELEPHONE ENCOUNTER
Lab orders from [address] [place] [address] need to be put in Epic. He will have those drawn at the [facility] in April.

## 2023-05-01 ENCOUNTER — HOSPITAL ENCOUNTER (OUTPATIENT)
Facility: HOSPITAL | Age: 58
Discharge: HOME | End: 2023-05-04

## 2023-05-01 LAB — SENTARA SPECIMEN COLLECTION: NORMAL

## 2023-05-01 PROCEDURE — 99001 SPECIMEN HANDLING PT-LAB: CPT

## 2023-05-02 LAB
A/G RATIO: 1.8 RATIO (ref 1.1–2.6)
ALBUMIN SERPL-MCNC: 4.2 G/DL (ref 3.5–5)
ALP BLD-CCNC: 84 U/L (ref 25–115)
ALT SERPL-CCNC: 31 U/L (ref 5–40)
ANION GAP SERPL CALCULATED.3IONS-SCNC: 11 MMOL/L (ref 3–15)
AST SERPL-CCNC: 24 U/L (ref 10–37)
AVERAGE GLUCOSE: 138 MG/DL (ref 91–123)
BILIRUB SERPL-MCNC: 0.5 MG/DL (ref 0.2–1.2)
BUN BLDV-MCNC: 12 MG/DL (ref 6–22)
CALCIUM SERPL-MCNC: 9 MG/DL (ref 8.4–10.5)
CHLORIDE BLD-SCNC: 105 MMOL/L (ref 98–110)
CHOLESTEROL/HDL RATIO: 5.6 (ref 0–5)
CHOLESTEROL: 168 MG/DL (ref 110–200)
CO2: 24 MMOL/L (ref 20–32)
CREAT SERPL-MCNC: 1 MG/DL (ref 0.5–1.2)
GLOBULIN: 2.3 G/DL (ref 2–4)
GLOMERULAR FILTRATION RATE: >60 ML/MIN/1.73 SQ.M.
GLUCOSE: 102 MG/DL (ref 70–99)
HBA1C MFR BLD: 6.4 % (ref 4.8–5.6)
HDLC SERPL-MCNC: 30 MG/DL
LDL CHOLESTEROL CALCULATED: 83 MG/DL (ref 50–99)
LDL/HDL RATIO: 2.8
NON-HDL CHOLESTEROL: 138 MG/DL
POTASSIUM SERPL-SCNC: 4.8 MMOL/L (ref 3.5–5.5)
SODIUM BLD-SCNC: 140 MMOL/L (ref 133–145)
TOTAL PROTEIN: 6.5 G/DL (ref 6.4–8.3)
TRIGL SERPL-MCNC: 277 MG/DL (ref 40–149)
VITAMIN D 25-HYDROXY: 32.5 NG/ML (ref 32–100)
VLDLC SERPL CALC-MCNC: 55 MG/DL (ref 8–30)

## 2023-05-05 SDOH — ECONOMIC STABILITY: FOOD INSECURITY: WITHIN THE PAST 12 MONTHS, YOU WORRIED THAT YOUR FOOD WOULD RUN OUT BEFORE YOU GOT MONEY TO BUY MORE.: PATIENT DECLINED

## 2023-05-05 SDOH — ECONOMIC STABILITY: INCOME INSECURITY: HOW HARD IS IT FOR YOU TO PAY FOR THE VERY BASICS LIKE FOOD, HOUSING, MEDICAL CARE, AND HEATING?: PATIENT DECLINED

## 2023-05-05 SDOH — ECONOMIC STABILITY: FOOD INSECURITY: WITHIN THE PAST 12 MONTHS, THE FOOD YOU BOUGHT JUST DIDN'T LAST AND YOU DIDN'T HAVE MONEY TO GET MORE.: PATIENT DECLINED

## 2023-05-05 SDOH — ECONOMIC STABILITY: TRANSPORTATION INSECURITY
IN THE PAST 12 MONTHS, HAS LACK OF TRANSPORTATION KEPT YOU FROM MEETINGS, WORK, OR FROM GETTING THINGS NEEDED FOR DAILY LIVING?: PATIENT DECLINED

## 2023-05-05 SDOH — ECONOMIC STABILITY: HOUSING INSECURITY
IN THE LAST 12 MONTHS, WAS THERE A TIME WHEN YOU DID NOT HAVE A STEADY PLACE TO SLEEP OR SLEPT IN A SHELTER (INCLUDING NOW)?: PATIENT REFUSED

## 2023-05-08 ENCOUNTER — OFFICE VISIT (OUTPATIENT)
Age: 58
End: 2023-05-08
Payer: COMMERCIAL

## 2023-05-08 VITALS
HEART RATE: 59 BPM | BODY MASS INDEX: 42.89 KG/M2 | TEMPERATURE: 98.6 F | SYSTOLIC BLOOD PRESSURE: 133 MMHG | WEIGHT: 283 LBS | RESPIRATION RATE: 18 BRPM | DIASTOLIC BLOOD PRESSURE: 77 MMHG | HEIGHT: 68 IN | OXYGEN SATURATION: 98 %

## 2023-05-08 DIAGNOSIS — Z12.5 PROSTATE CANCER SCREENING: ICD-10-CM

## 2023-05-08 DIAGNOSIS — E66.01 MORBID (SEVERE) OBESITY DUE TO EXCESS CALORIES (HCC): ICD-10-CM

## 2023-05-08 DIAGNOSIS — E78.2 MIXED HYPERLIPIDEMIA: ICD-10-CM

## 2023-05-08 DIAGNOSIS — E11.65 TYPE 2 DIABETES MELLITUS WITH HYPERGLYCEMIA, WITHOUT LONG-TERM CURRENT USE OF INSULIN (HCC): Primary | ICD-10-CM

## 2023-05-08 DIAGNOSIS — E55.9 VITAMIN D DEFICIENCY, UNSPECIFIED: ICD-10-CM

## 2023-05-08 DIAGNOSIS — I10 ESSENTIAL (PRIMARY) HYPERTENSION: ICD-10-CM

## 2023-05-08 PROCEDURE — 3074F SYST BP LT 130 MM HG: CPT | Performed by: INTERNAL MEDICINE

## 2023-05-08 PROCEDURE — 99214 OFFICE O/P EST MOD 30 MIN: CPT | Performed by: INTERNAL MEDICINE

## 2023-05-08 PROCEDURE — 3044F HG A1C LEVEL LT 7.0%: CPT | Performed by: INTERNAL MEDICINE

## 2023-05-08 PROCEDURE — 3078F DIAST BP <80 MM HG: CPT | Performed by: INTERNAL MEDICINE

## 2023-05-08 RX ORDER — LOSARTAN POTASSIUM 100 MG/1
100 TABLET ORAL DAILY
Qty: 90 TABLET | Refills: 2 | Status: SHIPPED | OUTPATIENT
Start: 2023-05-08

## 2023-05-08 RX ORDER — ATENOLOL 50 MG/1
50 TABLET ORAL DAILY
Qty: 90 TABLET | Refills: 2 | Status: SHIPPED | OUTPATIENT
Start: 2023-05-08

## 2023-05-08 ASSESSMENT — PATIENT HEALTH QUESTIONNAIRE - PHQ9
SUM OF ALL RESPONSES TO PHQ QUESTIONS 1-9: 0
1. LITTLE INTEREST OR PLEASURE IN DOING THINGS: 0
SUM OF ALL RESPONSES TO PHQ QUESTIONS 1-9: 0
2. FEELING DOWN, DEPRESSED OR HOPELESS: 0
SUM OF ALL RESPONSES TO PHQ9 QUESTIONS 1 & 2: 0

## 2023-05-08 NOTE — PROGRESS NOTES
Meir Parker presents today for   Chief Complaint   Patient presents with    Diabetes    Hypertension    Cholesterol Problem     6 month follow up       1. \"Have you been to the ER, urgent care clinic since your last visit? Hospitalized since your last visit? \" no    2. \"Have you seen or consulted any other health care providers outside of the [address] [place] [address] since your last visit? \" no     3. For patients aged 39-70: Has the patient had a colonoscopy / FIT/ Cologuard? Yes - no Care Gap present      If the patient is female:    4. For patients aged 41-77: Has the patient had a mammogram within the past 2 years? NA - based on age or sex      11. For patients aged 21-65: Has the patient had a pap smear?  NA - based on age or sex

## 2023-12-02 ENCOUNTER — HOSPITAL ENCOUNTER (OUTPATIENT)
Facility: HOSPITAL | Age: 58
Discharge: HOME | End: 2023-12-05

## 2023-12-02 LAB — SENTARA SPECIMEN COLLECTION: NORMAL

## 2023-12-03 LAB
A/G RATIO: 1.9 RATIO (ref 1.1–2.6)
ALBUMIN SERPL-MCNC: 4.4 G/DL (ref 3.5–5)
ALP BLD-CCNC: 80 U/L (ref 25–115)
ALT SERPL-CCNC: 32 U/L (ref 5–40)
ANION GAP SERPL CALCULATED.3IONS-SCNC: 10 MMOL/L (ref 3–15)
AST SERPL-CCNC: 24 U/L (ref 10–37)
AVERAGE GLUCOSE: 124 MG/DL (ref 91–123)
BILIRUB SERPL-MCNC: 0.4 MG/DL (ref 0.2–1.2)
BUN BLDV-MCNC: 7 MG/DL (ref 6–22)
CALCIUM SERPL-MCNC: 9.6 MG/DL (ref 8.4–10.5)
CHLORIDE BLD-SCNC: 104 MMOL/L (ref 98–110)
CHOLESTEROL/HDL RATIO: 7.1 (ref 0–5)
CHOLESTEROL: 227 MG/DL (ref 110–200)
CO2: 26 MMOL/L (ref 20–32)
CREAT SERPL-MCNC: 0.8 MG/DL (ref 0.5–1.2)
DIRECT LDL: 121 MG/DL (ref 50–99)
GLOBULIN: 2.3 G/DL (ref 2–4)
GLOMERULAR FILTRATION RATE: >60 ML/MIN/1.73 SQ.M.
GLUCOSE: 116 MG/DL (ref 70–99)
HBA1C MFR BLD: 6 % (ref 4.8–5.6)
HDLC SERPL-MCNC: 32 MG/DL
LDL CHOLESTEROL CALCULATED: ABNORMAL
LDL/HDL RATIO: ABNORMAL
NON-HDL CHOLESTEROL: 195 MG/DL
POTASSIUM SERPL-SCNC: 4.6 MMOL/L (ref 3.5–5.5)
PROSTATE SPECIFIC ANTIGEN: 0.91 NG/ML
SODIUM BLD-SCNC: 140 MMOL/L (ref 133–145)
TOTAL PROTEIN: 6.7 G/DL (ref 6.4–8.3)
TRIGL SERPL-MCNC: 401 MG/DL (ref 40–149)
VITAMIN D 25-HYDROXY: 23.9 NG/ML (ref 32–100)
VLDLC SERPL CALC-MCNC: ABNORMAL MG/DL

## 2023-12-12 ENCOUNTER — OFFICE VISIT (OUTPATIENT)
Age: 58
End: 2023-12-12
Payer: COMMERCIAL

## 2023-12-12 VITALS
DIASTOLIC BLOOD PRESSURE: 83 MMHG | RESPIRATION RATE: 20 BRPM | TEMPERATURE: 98.7 F | WEIGHT: 274 LBS | HEIGHT: 68 IN | BODY MASS INDEX: 41.52 KG/M2 | HEART RATE: 67 BPM | OXYGEN SATURATION: 98 % | SYSTOLIC BLOOD PRESSURE: 136 MMHG

## 2023-12-12 DIAGNOSIS — E66.01 MORBID (SEVERE) OBESITY DUE TO EXCESS CALORIES (HCC): ICD-10-CM

## 2023-12-12 DIAGNOSIS — E11.65 TYPE 2 DIABETES MELLITUS WITH HYPERGLYCEMIA, WITHOUT LONG-TERM CURRENT USE OF INSULIN (HCC): Primary | ICD-10-CM

## 2023-12-12 DIAGNOSIS — E78.2 MIXED HYPERLIPIDEMIA: ICD-10-CM

## 2023-12-12 DIAGNOSIS — E55.9 VITAMIN D DEFICIENCY, UNSPECIFIED: ICD-10-CM

## 2023-12-12 DIAGNOSIS — I10 ESSENTIAL (PRIMARY) HYPERTENSION: ICD-10-CM

## 2023-12-12 PROCEDURE — 3044F HG A1C LEVEL LT 7.0%: CPT | Performed by: INTERNAL MEDICINE

## 2023-12-12 PROCEDURE — 3075F SYST BP GE 130 - 139MM HG: CPT | Performed by: INTERNAL MEDICINE

## 2023-12-12 PROCEDURE — 3078F DIAST BP <80 MM HG: CPT | Performed by: INTERNAL MEDICINE

## 2023-12-12 PROCEDURE — 99214 OFFICE O/P EST MOD 30 MIN: CPT | Performed by: INTERNAL MEDICINE

## 2023-12-12 NOTE — PROGRESS NOTES
Venu Knight presents today for   Chief Complaint   Patient presents with    Hypertension    Diabetes    Cholesterol Problem     Follow up                  1. \"Have you been to the ER, urgent care clinic since your last visit? Hospitalized since your last visit? \" no    2. \"Have you seen or consulted any other health care providers outside of the [address] [place] [address] since your last visit? \" no     3. For patients aged 43-73: Has the patient had a colonoscopy / FIT/ Cologuard? Yes - no Care Gap present      If the patient is female:    4. For patients aged 43-66: Has the patient had a mammogram within the past 2 years? NA - based on age or sex      11. For patients aged 21-65: Has the patient had a pap smear?  NA - based on age or sex

## 2023-12-12 NOTE — PROGRESS NOTES
Subjective:       Chief Complaint  The patient presents for follow up of hypertension and high cholesterol. Obesity, vit D deficiency         HPI  Isac Alexandre is a 62 y.o.. male seen for follow up of hyperlipidemia. Healso has hypertension. Hypertension well controlled, on Tenormin 50 mg and Cozaar 100 mg. Patient continues to struggle to lose weight and keep it off. . hyperlipidemia poorly controlled, patient has had difficulty tolerating statins even Crestor 5 mg 3 times a week with taking co-Q10, patient's going to try again to work on losing more weight to improve further. Kenton Miranda He will work on cutting back processed foods and increasing protein in his diet. He will continue to work on decreasing his BMI of 41 by cutting back sugar and starches in his diet. He may be candidate for medical weight loss surgery. He did a Heart Scan 10/2020 and his CAC score is 120. .The 10-year ASCVD risk score (Andi UNGER, et al., 2019) is: 26.9%    Patient was diagnosed with diabetes and started on metformin but instead of taking it he worked with his wife to lose weight and is trying to exercise. His Hba1c is stable candidate for GLP-1 which may help with weight loss. Diet and Lifestyle: generally follows a low fat low cholesterol diet, follows a diabetic diet regularly, exercises sporadically. Patient encouraged to do more resistance training which may help with insulin sensitivity    Home BP Monitoring: is not measured at home. Other symptoms and concerns: Pt's vit D level is  uncontrolled. He will try to take vit D 2000 units/day consistently. Current Outpatient Medications   Medication Sig    atenolol (TENORMIN) 50 MG tablet Take 1 tablet by mouth daily    losartan (COZAAR) 100 MG tablet Take 1 tablet by mouth daily    fluticasone (FLONASE) 50 MCG/ACT nasal spray 2 sprays by Nasal route daily     No current facility-administered medications for this visit.                  Review of Systems  Respiratory:

## 2024-04-12 DIAGNOSIS — E11.65 TYPE 2 DIABETES MELLITUS WITH HYPERGLYCEMIA, WITHOUT LONG-TERM CURRENT USE OF INSULIN (HCC): ICD-10-CM

## 2024-04-12 DIAGNOSIS — E55.9 VITAMIN D DEFICIENCY, UNSPECIFIED: ICD-10-CM

## 2024-04-12 DIAGNOSIS — E78.2 MIXED HYPERLIPIDEMIA: ICD-10-CM

## 2024-04-12 DIAGNOSIS — I10 ESSENTIAL (PRIMARY) HYPERTENSION: ICD-10-CM

## 2024-07-26 ENCOUNTER — HOSPITAL ENCOUNTER (OUTPATIENT)
Facility: HOSPITAL | Age: 59
Discharge: HOME | End: 2024-07-29

## 2024-07-26 LAB — SENTARA SPECIMEN COLLECTION: NORMAL

## 2024-07-26 PROCEDURE — 99001 SPECIMEN HANDLING PT-LAB: CPT

## 2024-07-30 LAB
A/G RATIO: 2.2 RATIO (ref 1.1–2.6)
ALBUMIN: 4.2 G/DL (ref 3.5–5)
ALP BLD-CCNC: 76 U/L (ref 25–115)
ALT SERPL-CCNC: 32 U/L (ref 5–40)
ANION GAP SERPL CALCULATED.3IONS-SCNC: 10 MMOL/L (ref 3–15)
AST SERPL-CCNC: 24 U/L (ref 10–37)
BILIRUB SERPL-MCNC: 0.3 MG/DL (ref 0.2–1.2)
BUN BLDV-MCNC: 14 MG/DL (ref 6–22)
CALCIUM SERPL-MCNC: 9 MG/DL (ref 8.4–10.5)
CHLORIDE BLD-SCNC: 102 MMOL/L (ref 98–110)
CHOLESTEROL, TOTAL: 299 MG/DL (ref 110–200)
CHOLESTEROL/HDL RATIO: 12 (ref 0–5)
CO2: 25 MMOL/L (ref 20–32)
CREAT SERPL-MCNC: 0.9 MG/DL (ref 0.5–1.2)
CREATININE URINE: 135 MG/DL
DIRECT LDL: 97 MG/DL (ref 50–99)
ESTIMATED AVERAGE GLUCOSE: 136 MG/DL (ref 91–123)
GFR, ESTIMATED: >60 ML/MIN/1.73 SQ.M.
GLOBULIN: 1.9 G/DL (ref 2–4)
GLUCOSE: 123 MG/DL (ref 70–99)
HBA1C MFR BLD: 6.4 % (ref 4.8–5.6)
HDLC SERPL-MCNC: 25 MG/DL
LDL CHOLESTEROL: ABNORMAL
LDL/HDL RATIO: ABNORMAL
MICROALB/CREAT RATIO (UG/MG CREAT.): 10.7 (ref 0–30)
MICROALBUMIN/CREAT 24H UR: 14.4 MG/L (ref 0.1–17)
NON-HDL CHOLESTEROL: 274 MG/DL
POTASSIUM SERPL-SCNC: 4.7 MMOL/L (ref 3.5–5.5)
SODIUM BLD-SCNC: 137 MMOL/L (ref 133–145)
TOTAL PROTEIN: 6.1 G/DL (ref 6.4–8.3)
TRIGL SERPL-MCNC: 689 MG/DL (ref 40–149)
VITAMIN D 25-HYDROXY: 36 NG/ML (ref 32–100)
VLDLC SERPL CALC-MCNC: ABNORMAL MG/DL

## 2024-07-31 SDOH — ECONOMIC STABILITY: HOUSING INSECURITY
IN THE LAST 12 MONTHS, WAS THERE A TIME WHEN YOU DID NOT HAVE A STEADY PLACE TO SLEEP OR SLEPT IN A SHELTER (INCLUDING NOW)?: PATIENT DECLINED

## 2024-07-31 SDOH — ECONOMIC STABILITY: FOOD INSECURITY: WITHIN THE PAST 12 MONTHS, YOU WORRIED THAT YOUR FOOD WOULD RUN OUT BEFORE YOU GOT MONEY TO BUY MORE.: PATIENT DECLINED

## 2024-07-31 SDOH — ECONOMIC STABILITY: FOOD INSECURITY: WITHIN THE PAST 12 MONTHS, THE FOOD YOU BOUGHT JUST DIDN'T LAST AND YOU DIDN'T HAVE MONEY TO GET MORE.: PATIENT DECLINED

## 2024-07-31 SDOH — ECONOMIC STABILITY: INCOME INSECURITY: HOW HARD IS IT FOR YOU TO PAY FOR THE VERY BASICS LIKE FOOD, HOUSING, MEDICAL CARE, AND HEATING?: PATIENT DECLINED

## 2024-08-02 ENCOUNTER — OFFICE VISIT (OUTPATIENT)
Facility: CLINIC | Age: 59
End: 2024-08-02
Payer: COMMERCIAL

## 2024-08-02 VITALS
BODY MASS INDEX: 42.89 KG/M2 | DIASTOLIC BLOOD PRESSURE: 84 MMHG | RESPIRATION RATE: 20 BRPM | OXYGEN SATURATION: 98 % | TEMPERATURE: 97.5 F | WEIGHT: 283 LBS | SYSTOLIC BLOOD PRESSURE: 153 MMHG | HEART RATE: 57 BPM | HEIGHT: 68 IN

## 2024-08-02 DIAGNOSIS — I10 ESSENTIAL (PRIMARY) HYPERTENSION: ICD-10-CM

## 2024-08-02 DIAGNOSIS — E66.01 MORBID (SEVERE) OBESITY DUE TO EXCESS CALORIES (HCC): ICD-10-CM

## 2024-08-02 DIAGNOSIS — E55.9 VITAMIN D DEFICIENCY, UNSPECIFIED: ICD-10-CM

## 2024-08-02 DIAGNOSIS — E78.2 MIXED HYPERLIPIDEMIA: ICD-10-CM

## 2024-08-02 DIAGNOSIS — E11.65 TYPE 2 DIABETES MELLITUS WITH HYPERGLYCEMIA, WITHOUT LONG-TERM CURRENT USE OF INSULIN (HCC): Primary | ICD-10-CM

## 2024-08-02 DIAGNOSIS — E78.1 HYPERTRIGLYCERIDEMIA: ICD-10-CM

## 2024-08-02 PROCEDURE — 3044F HG A1C LEVEL LT 7.0%: CPT | Performed by: INTERNAL MEDICINE

## 2024-08-02 PROCEDURE — 3077F SYST BP >= 140 MM HG: CPT | Performed by: INTERNAL MEDICINE

## 2024-08-02 PROCEDURE — 3078F DIAST BP <80 MM HG: CPT | Performed by: INTERNAL MEDICINE

## 2024-08-02 PROCEDURE — 99214 OFFICE O/P EST MOD 30 MIN: CPT | Performed by: INTERNAL MEDICINE

## 2024-08-02 RX ORDER — SEMAGLUTIDE 0.68 MG/ML
INJECTION, SOLUTION SUBCUTANEOUS
Qty: 3 ML | Refills: 0 | Status: SHIPPED | OUTPATIENT
Start: 2024-08-02 | End: 2024-09-30

## 2024-08-02 RX ORDER — LOSARTAN POTASSIUM 100 MG/1
100 TABLET ORAL DAILY
Qty: 90 TABLET | Refills: 2 | Status: CANCELLED | OUTPATIENT
Start: 2024-08-02

## 2024-08-02 RX ORDER — VALSARTAN AND HYDROCHLOROTHIAZIDE 160; 12.5 MG/1; MG/1
1 TABLET, FILM COATED ORAL DAILY
Qty: 90 TABLET | Refills: 1 | Status: SHIPPED | OUTPATIENT
Start: 2024-08-02

## 2024-08-02 RX ORDER — FLUTICASONE PROPIONATE 50 MCG
2 SPRAY, SUSPENSION (ML) NASAL DAILY
Qty: 16 G | Refills: 5 | Status: SHIPPED | OUTPATIENT
Start: 2024-08-02

## 2024-08-02 RX ORDER — ICOSAPENT ETHYL 1 G/1
2 CAPSULE ORAL 2 TIMES DAILY
Qty: 120 CAPSULE | Refills: 3 | Status: SHIPPED | OUTPATIENT
Start: 2024-08-02

## 2024-08-02 RX ORDER — ATENOLOL 50 MG/1
50 TABLET ORAL DAILY
Qty: 90 TABLET | Refills: 2 | Status: SHIPPED | OUTPATIENT
Start: 2024-08-02

## 2024-08-02 ASSESSMENT — PATIENT HEALTH QUESTIONNAIRE - PHQ9
2. FEELING DOWN, DEPRESSED OR HOPELESS: NOT AT ALL
SUM OF ALL RESPONSES TO PHQ QUESTIONS 1-9: 0
SUM OF ALL RESPONSES TO PHQ QUESTIONS 1-9: 0
SUM OF ALL RESPONSES TO PHQ9 QUESTIONS 1 & 2: 0
1. LITTLE INTEREST OR PLEASURE IN DOING THINGS: NOT AT ALL
SUM OF ALL RESPONSES TO PHQ QUESTIONS 1-9: 0
SUM OF ALL RESPONSES TO PHQ QUESTIONS 1-9: 0

## 2024-08-02 NOTE — PROGRESS NOTES
Subjective:       Chief Complaint  The patient presents for follow up of hypertension and high cholesterol. Obesity, vit D deficiency         HPI  Frederick Harrell is a 59 y.o.. male seen for follow up of hyperlipidemia.  Healso has hypertension. Hypertension uncontrolled, on Tenormin 50 mg and Cozaar 100 mg.  Patient continues to struggle to lose weight and keep it off. . hyperlipidemia poorly controlled, patient has had difficulty tolerating statins even Crestor 5 mg 3 times a week with taking co-Q10, triglycerides are significantly elevated over 600 and he would benefit from Vascepa to decrease cardiovascular risk.  He will work on cutting back processed foods and increasing protein in his diet.  He will continue to work on decreasing his BMI of 43 by cutting back sugar and starches in his diet. He may be candidate for medical weight loss surgery. He did a Heart Scan 10/2020 and his CAC score is 120. .The 10-year ASCVD risk score (Andi UNGER, et al., 2019) is: 48.2% patient is encouraged to do another heart scan to see if his calcium score is going up and he might be a candidate for Repatha    Patient was diagnosed with diabetes and started on metformin but instead of taking it he worked with his wife to lose weight and is trying to exercise.  He is a good candidate for GLP-1 which may help with weight loss.      Diet and Lifestyle: generally follows a low fat low cholesterol diet, follows a diabetic diet regularly, exercises sporadically.  Patient encouraged to do more resistance training which may help with insulin sensitivity    Home BP Monitoring: is not measured at home.    Other symptoms and concerns: Pt's vit D  is better controlled. He will continue vit D 2000 units/day consistently.    Current Outpatient Medications   Medication Sig    atenolol (TENORMIN) 50 MG tablet Take 1 tablet by mouth daily    fluticasone (FLONASE) 50 MCG/ACT nasal spray 2 sprays by Nasal route daily    Semaglutide,0.25 or 0.5MG/DOS,

## 2024-08-02 NOTE — PROGRESS NOTES
Frederick Harrell presents today for   Chief Complaint   Patient presents with    Cholesterol Problem    Hypertension    Diabetes     6 month follow up            \"Have you been to the ER, urgent care clinic since your last visit?  Hospitalized since your last visit?\"    NO    “Have you seen or consulted any other health care providers outside of [facility] since your last visit?”    NO

## 2024-08-16 ENCOUNTER — HOSPITAL ENCOUNTER (OUTPATIENT)
Facility: HOSPITAL | Age: 59
Discharge: HOME | End: 2024-08-16
Attending: INTERNAL MEDICINE

## 2024-08-16 DIAGNOSIS — E78.2 MIXED HYPERLIPIDEMIA: ICD-10-CM

## 2024-08-16 PROCEDURE — 75571 CT HRT W/O DYE W/CA TEST: CPT

## 2024-09-09 DIAGNOSIS — E11.65 TYPE 2 DIABETES MELLITUS WITH HYPERGLYCEMIA, WITHOUT LONG-TERM CURRENT USE OF INSULIN (HCC): ICD-10-CM

## 2024-09-09 RX ORDER — SEMAGLUTIDE 0.68 MG/ML
INJECTION, SOLUTION SUBCUTANEOUS
Qty: 3 ML | Refills: 0 | Status: CANCELLED | OUTPATIENT
Start: 2024-09-09 | End: 2024-11-08

## 2024-09-10 RX ORDER — SEMAGLUTIDE 1.34 MG/ML
1 INJECTION, SOLUTION SUBCUTANEOUS
Qty: 3 ML | Refills: 5 | Status: SHIPPED | OUTPATIENT
Start: 2024-09-10

## 2024-10-26 ENCOUNTER — HOSPITAL ENCOUNTER (OUTPATIENT)
Facility: HOSPITAL | Age: 59
Setting detail: SPECIMEN
Discharge: HOME | End: 2024-10-29

## 2024-10-26 LAB — SENTARA SPECIMEN COLLECTION: NORMAL

## 2024-10-26 PROCEDURE — 99001 SPECIMEN HANDLING PT-LAB: CPT

## 2024-10-27 LAB
A/G RATIO: 2 RATIO (ref 1.1–2.6)
ALBUMIN: 4.4 G/DL (ref 3.5–5)
ALP BLD-CCNC: 71 U/L (ref 25–115)
ALT SERPL-CCNC: 35 U/L (ref 5–40)
ANION GAP SERPL CALCULATED.3IONS-SCNC: 15 MMOL/L (ref 3–15)
AST SERPL-CCNC: 26 U/L (ref 10–37)
BASOPHILS ABSOLUTE: 0.1 K/UL (ref 0–0.2)
BASOPHILS RELATIVE PERCENT: 1 % (ref 0–2)
BILIRUB SERPL-MCNC: 0.4 MG/DL (ref 0.2–1.2)
BUN BLDV-MCNC: 16 MG/DL (ref 6–22)
CALCIUM SERPL-MCNC: 9.7 MG/DL (ref 8.4–10.5)
CHLORIDE BLD-SCNC: 100 MMOL/L (ref 98–110)
CHOLESTEROL, TOTAL: 216 MG/DL (ref 110–200)
CHOLESTEROL/HDL RATIO: 8 (ref 0–5)
CO2: 25 MMOL/L (ref 20–32)
CREAT SERPL-MCNC: 0.8 MG/DL (ref 0.5–1.2)
DIRECT LDL: 103 MG/DL (ref 50–99)
EOSINOPHIL # BLD: 4 % (ref 0–6)
EOSINOPHILS ABSOLUTE: 0.3 K/UL (ref 0–0.5)
ESTIMATED AVERAGE GLUCOSE: 130 MG/DL (ref 91–123)
GFR, ESTIMATED: >60 ML/MIN/1.73 SQ.M.
GLOBULIN: 2.2 G/DL (ref 2–4)
GLUCOSE: 107 MG/DL (ref 70–99)
HBA1C MFR BLD: 6.2 % (ref 4.8–5.6)
HCT VFR BLD CALC: 48.4 % (ref 39.3–51.6)
HDLC SERPL-MCNC: 27 MG/DL
HEMOGLOBIN: 15.1 G/DL (ref 13.1–17.2)
LDL CHOLESTEROL: ABNORMAL
LDL/HDL RATIO: ABNORMAL
LYMPHOCYTES # BLD: 24 % (ref 20–45)
LYMPHOCYTES ABSOLUTE: 2.1 K/UL (ref 1–4.8)
MCH RBC QN AUTO: 30 PG (ref 26–34)
MCHC RBC AUTO-ENTMCNC: 31 G/DL (ref 31–36)
MCV RBC AUTO: 96 FL (ref 80–95)
MONOCYTES ABSOLUTE: 0.8 K/UL (ref 0.1–1)
MONOCYTES: 9 % (ref 3–12)
NEUTROPHILS ABSOLUTE: 5.5 K/UL (ref 1.8–7.7)
NEUTROPHILS: 62 % (ref 40–75)
NON-HDL CHOLESTEROL: 189 MG/DL
PDW BLD-RTO: 14.1 % (ref 10–15.5)
PLATELET # BLD: 231 K/UL (ref 140–440)
PMV BLD AUTO: 9.5 FL (ref 9–13)
POTASSIUM SERPL-SCNC: 4.4 MMOL/L (ref 3.5–5.5)
RBC # BLD: 5.06 M/UL (ref 3.8–5.8)
SODIUM BLD-SCNC: 140 MMOL/L (ref 133–145)
TOTAL PROTEIN: 6.6 G/DL (ref 6.4–8.3)
TRIGL SERPL-MCNC: 433 MG/DL (ref 40–149)
VITAMIN D 25-HYDROXY: 46.6 NG/ML (ref 32–100)
VLDLC SERPL CALC-MCNC: ABNORMAL MG/DL
WBC # BLD: 8.8 K/UL (ref 4–11)

## 2024-11-02 DIAGNOSIS — E11.65 TYPE 2 DIABETES MELLITUS WITH HYPERGLYCEMIA, WITHOUT LONG-TERM CURRENT USE OF INSULIN (HCC): ICD-10-CM

## 2024-11-02 DIAGNOSIS — I10 ESSENTIAL (PRIMARY) HYPERTENSION: ICD-10-CM

## 2024-11-02 DIAGNOSIS — E78.2 MIXED HYPERLIPIDEMIA: ICD-10-CM

## 2024-11-02 DIAGNOSIS — E55.9 VITAMIN D DEFICIENCY, UNSPECIFIED: ICD-10-CM

## 2024-11-04 ENCOUNTER — OFFICE VISIT (OUTPATIENT)
Facility: CLINIC | Age: 59
End: 2024-11-04
Payer: COMMERCIAL

## 2024-11-04 VITALS
BODY MASS INDEX: 40.62 KG/M2 | HEIGHT: 68 IN | RESPIRATION RATE: 20 BRPM | TEMPERATURE: 97.8 F | SYSTOLIC BLOOD PRESSURE: 117 MMHG | DIASTOLIC BLOOD PRESSURE: 73 MMHG | OXYGEN SATURATION: 100 % | WEIGHT: 268 LBS | HEART RATE: 63 BPM

## 2024-11-04 DIAGNOSIS — E55.9 VITAMIN D DEFICIENCY, UNSPECIFIED: ICD-10-CM

## 2024-11-04 DIAGNOSIS — E11.65 TYPE 2 DIABETES MELLITUS WITH HYPERGLYCEMIA, WITHOUT LONG-TERM CURRENT USE OF INSULIN (HCC): Primary | ICD-10-CM

## 2024-11-04 DIAGNOSIS — Z12.5 PROSTATE CANCER SCREENING: ICD-10-CM

## 2024-11-04 DIAGNOSIS — I10 ESSENTIAL (PRIMARY) HYPERTENSION: ICD-10-CM

## 2024-11-04 DIAGNOSIS — E66.01 MORBID (SEVERE) OBESITY DUE TO EXCESS CALORIES: ICD-10-CM

## 2024-11-04 DIAGNOSIS — E78.2 MIXED HYPERLIPIDEMIA: ICD-10-CM

## 2024-11-04 PROCEDURE — 3044F HG A1C LEVEL LT 7.0%: CPT | Performed by: INTERNAL MEDICINE

## 2024-11-04 PROCEDURE — 99214 OFFICE O/P EST MOD 30 MIN: CPT | Performed by: INTERNAL MEDICINE

## 2024-11-04 PROCEDURE — 3078F DIAST BP <80 MM HG: CPT | Performed by: INTERNAL MEDICINE

## 2024-11-04 PROCEDURE — 3074F SYST BP LT 130 MM HG: CPT | Performed by: INTERNAL MEDICINE

## 2024-11-04 RX ORDER — SEMAGLUTIDE 2.68 MG/ML
2 INJECTION, SOLUTION SUBCUTANEOUS
Qty: 3 ML | Refills: 5 | Status: SHIPPED | OUTPATIENT
Start: 2024-11-04

## 2024-11-04 NOTE — PROGRESS NOTES
Frederick Harrell presents today for   Chief Complaint   Patient presents with    Diabetes    Hypertension    Cholesterol Problem     3 month follow up            \"Have you been to the ER, urgent care clinic since your last visit?  Hospitalized since your last visit?\"    NO    “Have you seen or consulted any other health care providers outside of [facility] since your last visit?”    NO           
Subjective:       Chief Complaint  The patient presents for follow up of hypertension and high cholesterol. Obesity, vit D deficiency         HPI  Frederick Harrell is a 59 y.o.. male seen for follow up of hyperlipidemia.  Healso has hypertension. Hypertension well controlled, on Tenormin 50 mg and DiovanHct 160/12.5.  Patient has done well with weight loss after starting Ozempic. . hyperlipidemia improving control with weight loss on Ozempic and Vascepa.  Patient unable to tolerate statins, He will work on cutting back processed foods and increasing protein in his diet.  He will continue to work on decreasing his BMI of 40 by cutting back sugar and starches in his diet. He did a Heart Scan 8/2024and his CAC score is 119 which is unchanged from about 4 yrs ago.. .The 10-year ASCVD risk score (Andi UNGER, et al., 2019) is: 24.2%     Patient's diabetes is improving on Ozempic 1 mg and we will go ahead and increase to 2 mg which will also help with weight loss      Diet and Lifestyle: generally follows a low fat low cholesterol diet, follows a diabetic diet regularly, exercises sporadically.  Patient encouraged to do more resistance training which may help with insulin sensitivity    Home BP Monitoring: is not measured at home.    Other symptoms and concerns: Pt's vit D  is better controlled. He will continue vit D 2000 units/day consistently.    Current Outpatient Medications   Medication Sig    semaglutide, 2 MG/DOSE, (OZEMPIC, 2 MG/DOSE,) 8 MG/3ML SOPN sc injection Inject 2 mg into the skin every 7 days    atenolol (TENORMIN) 50 MG tablet Take 1 tablet by mouth daily    fluticasone (FLONASE) 50 MCG/ACT nasal spray 2 sprays by Nasal route daily    Icosapent Ethyl (VASCEPA) 1 g CAPS capsule Take 2 capsules by mouth 2 times daily    valsartan-hydroCHLOROthiazide (DIOVAN HCT) 160-12.5 MG per tablet Take 1 tablet by mouth daily     No current facility-administered medications for this visit.                 Review of 
declines

## 2025-02-04 DIAGNOSIS — E78.2 MIXED HYPERLIPIDEMIA: ICD-10-CM

## 2025-02-04 DIAGNOSIS — E11.65 TYPE 2 DIABETES MELLITUS WITH HYPERGLYCEMIA, WITHOUT LONG-TERM CURRENT USE OF INSULIN (HCC): ICD-10-CM

## 2025-02-04 DIAGNOSIS — I10 ESSENTIAL (PRIMARY) HYPERTENSION: ICD-10-CM

## 2025-02-04 DIAGNOSIS — Z12.5 PROSTATE CANCER SCREENING: ICD-10-CM

## 2025-02-04 DIAGNOSIS — E55.9 VITAMIN D DEFICIENCY, UNSPECIFIED: ICD-10-CM

## 2025-02-11 DIAGNOSIS — I10 ESSENTIAL (PRIMARY) HYPERTENSION: ICD-10-CM

## 2025-02-12 RX ORDER — VALSARTAN AND HYDROCHLOROTHIAZIDE 160; 12.5 MG/1; MG/1
1 TABLET, FILM COATED ORAL DAILY
Qty: 90 TABLET | Refills: 1 | Status: SHIPPED | OUTPATIENT
Start: 2025-02-12

## 2025-02-22 ENCOUNTER — HOSPITAL ENCOUNTER (OUTPATIENT)
Facility: HOSPITAL | Age: 60
Discharge: HOME | End: 2025-02-25

## 2025-02-22 LAB
A/G RATIO: 1.7 RATIO (ref 1.1–2.6)
ALBUMIN: 4.4 G/DL (ref 3.5–5)
ALP BLD-CCNC: 67 U/L (ref 40–125)
ALT SERPL-CCNC: 36 U/L (ref 5–40)
ANION GAP SERPL CALCULATED.3IONS-SCNC: 15 MMOL/L (ref 3–15)
AST SERPL-CCNC: 24 U/L (ref 10–37)
BILIRUB SERPL-MCNC: 0.5 MG/DL (ref 0.2–1.2)
BUN BLDV-MCNC: 15 MG/DL (ref 6–22)
CALCIUM SERPL-MCNC: 9.6 MG/DL (ref 8.4–10.5)
CHLORIDE BLD-SCNC: 97 MMOL/L (ref 98–110)
CHOLESTEROL, TOTAL: 241 MG/DL (ref 110–200)
CHOLESTEROL/HDL RATIO: 8 (ref 0–5)
CO2: 26 MMOL/L (ref 20–32)
CREAT SERPL-MCNC: 1 MG/DL (ref 0.8–1.6)
ESTIMATED AVERAGE GLUCOSE: 122 MG/DL (ref 91–123)
GFR, ESTIMATED: >60 ML/MIN/1.73 SQ.M.
GLOBULIN: 2.6 G/DL (ref 2–4)
GLUCOSE: 110 MG/DL (ref 70–99)
HBA1C MFR BLD: 5.9 % (ref 4.8–5.6)
HDLC SERPL-MCNC: 30 MG/DL
LDL CHOLESTEROL DIRECT: 124 MG/DL (ref 50–99)
LDL CHOLESTEROL: ABNORMAL
LDL/HDL RATIO: ABNORMAL
NON-HDL CHOLESTEROL: 211 MG/DL
POTASSIUM SERPL-SCNC: 4.8 MMOL/L (ref 3.5–5.5)
SCREENING PSA: 0.98 NG/ML
SENTARA SPECIMEN COLLECTION: NORMAL
SODIUM BLD-SCNC: 138 MMOL/L (ref 133–145)
TOTAL PROTEIN: 7 G/DL (ref 6.2–8.1)
TRIGL SERPL-MCNC: 465 MG/DL (ref 40–149)
VITAMIN D 25-HYDROXY: 45.8 NG/ML (ref 32–100)
VLDLC SERPL CALC-MCNC: ABNORMAL MG/DL

## 2025-02-22 PROCEDURE — 99001 SPECIMEN HANDLING PT-LAB: CPT

## 2025-03-01 SDOH — ECONOMIC STABILITY: TRANSPORTATION INSECURITY
IN THE PAST 12 MONTHS, HAS LACK OF TRANSPORTATION KEPT YOU FROM MEETINGS, WORK, OR FROM GETTING THINGS NEEDED FOR DAILY LIVING?: NO

## 2025-03-01 SDOH — ECONOMIC STABILITY: FOOD INSECURITY: WITHIN THE PAST 12 MONTHS, THE FOOD YOU BOUGHT JUST DIDN'T LAST AND YOU DIDN'T HAVE MONEY TO GET MORE.: NEVER TRUE

## 2025-03-01 SDOH — ECONOMIC STABILITY: FOOD INSECURITY: WITHIN THE PAST 12 MONTHS, YOU WORRIED THAT YOUR FOOD WOULD RUN OUT BEFORE YOU GOT MONEY TO BUY MORE.: NEVER TRUE

## 2025-03-01 SDOH — ECONOMIC STABILITY: TRANSPORTATION INSECURITY
IN THE PAST 12 MONTHS, HAS THE LACK OF TRANSPORTATION KEPT YOU FROM MEDICAL APPOINTMENTS OR FROM GETTING MEDICATIONS?: NO

## 2025-03-01 SDOH — ECONOMIC STABILITY: INCOME INSECURITY: IN THE LAST 12 MONTHS, WAS THERE A TIME WHEN YOU WERE NOT ABLE TO PAY THE MORTGAGE OR RENT ON TIME?: NO

## 2025-03-04 ENCOUNTER — OFFICE VISIT (OUTPATIENT)
Facility: CLINIC | Age: 60
End: 2025-03-04
Payer: COMMERCIAL

## 2025-03-04 VITALS
OXYGEN SATURATION: 99 % | WEIGHT: 271 LBS | BODY MASS INDEX: 41.07 KG/M2 | SYSTOLIC BLOOD PRESSURE: 131 MMHG | HEIGHT: 68 IN | RESPIRATION RATE: 20 BRPM | HEART RATE: 77 BPM | DIASTOLIC BLOOD PRESSURE: 73 MMHG | TEMPERATURE: 98.7 F

## 2025-03-04 DIAGNOSIS — E78.2 MIXED HYPERLIPIDEMIA: ICD-10-CM

## 2025-03-04 DIAGNOSIS — I10 ESSENTIAL (PRIMARY) HYPERTENSION: ICD-10-CM

## 2025-03-04 DIAGNOSIS — E11.65 TYPE 2 DIABETES MELLITUS WITH HYPERGLYCEMIA, WITHOUT LONG-TERM CURRENT USE OF INSULIN (HCC): Primary | ICD-10-CM

## 2025-03-04 DIAGNOSIS — M1A.0420 CHRONIC GOUT OF LEFT HAND, UNSPECIFIED CAUSE: ICD-10-CM

## 2025-03-04 DIAGNOSIS — E66.01 MORBID (SEVERE) OBESITY DUE TO EXCESS CALORIES: ICD-10-CM

## 2025-03-04 DIAGNOSIS — I25.10 CORONARY ARTERY DISEASE INVOLVING NATIVE CORONARY ARTERY OF NATIVE HEART WITHOUT ANGINA PECTORIS: ICD-10-CM

## 2025-03-04 DIAGNOSIS — E55.9 VITAMIN D DEFICIENCY, UNSPECIFIED: ICD-10-CM

## 2025-03-04 PROCEDURE — 3044F HG A1C LEVEL LT 7.0%: CPT | Performed by: INTERNAL MEDICINE

## 2025-03-04 PROCEDURE — 99214 OFFICE O/P EST MOD 30 MIN: CPT | Performed by: INTERNAL MEDICINE

## 2025-03-04 PROCEDURE — 3075F SYST BP GE 130 - 139MM HG: CPT | Performed by: INTERNAL MEDICINE

## 2025-03-04 PROCEDURE — 3078F DIAST BP <80 MM HG: CPT | Performed by: INTERNAL MEDICINE

## 2025-03-04 RX ORDER — VALSARTAN 320 MG/1
320 TABLET ORAL DAILY
Qty: 90 TABLET | Refills: 1 | Status: SHIPPED | OUTPATIENT
Start: 2025-03-04

## 2025-03-04 RX ORDER — EZETIMIBE 10 MG/1
10 TABLET ORAL DAILY
Qty: 90 TABLET | Refills: 1 | Status: SHIPPED | OUTPATIENT
Start: 2025-03-04

## 2025-03-04 SDOH — ECONOMIC STABILITY: FOOD INSECURITY: WITHIN THE PAST 12 MONTHS, YOU WORRIED THAT YOUR FOOD WOULD RUN OUT BEFORE YOU GOT MONEY TO BUY MORE.: NEVER TRUE

## 2025-03-04 SDOH — ECONOMIC STABILITY: FOOD INSECURITY: WITHIN THE PAST 12 MONTHS, THE FOOD YOU BOUGHT JUST DIDN'T LAST AND YOU DIDN'T HAVE MONEY TO GET MORE.: NEVER TRUE

## 2025-03-04 ASSESSMENT — PATIENT HEALTH QUESTIONNAIRE - PHQ9
SUM OF ALL RESPONSES TO PHQ QUESTIONS 1-9: 0
2. FEELING DOWN, DEPRESSED OR HOPELESS: NOT AT ALL
1. LITTLE INTEREST OR PLEASURE IN DOING THINGS: NOT AT ALL
SUM OF ALL RESPONSES TO PHQ QUESTIONS 1-9: 0

## 2025-03-04 NOTE — PROGRESS NOTES
Subjective:       Chief Complaint  The patient presents for follow up of hypertension and high cholesterol. Obesity, vit D deficiency         HPI  Frederick Harrell is a 60 y.o.. male seen for follow up of hyperlipidemia.  Healso has hypertension. Hypertension well controlled, on Tenormin 50 mg and DiovanHct 160/12.5 but patient is having issues with gout so we will DC the HCTZ and increase the valsartan..  Patient has done well with weight loss after starting Ozempic but he is currently stalled and will consider doing intermittent fasting.. . hyperlipidemia improving control with weight loss on Ozempic and Vascepa.  Patient unable to tolerate statins, He will work on cutting back processed foods and increasing protein in his diet.  He will continue to work on decreasing his BMI of 40 by cutting back sugar and starches in his diet. He did a Heart Scan 8/2024and his CAC score is 119 which is unchanged from about 4 yrs ago.. .The 10-year ASCVD risk score (Andi UNGER, et al., 2019) is: 31.1% discussed adding Zetia but if the LDL is not improving would consider seeing if insurance will cover Repatha    Patient's diabetes is well-controlled on Ozempic 2 mg.  Consider changing to Mounjaro in the past if patient needs additional help with weight loss.      Diet and Lifestyle: generally follows a low fat low cholesterol diet, follows a diabetic diet regularly, exercises sporadically.  Patient encouraged to do more resistance training which may help with insulin sensitivity    Home BP Monitoring: is not measured at home.    Other symptoms and concerns: Pt's vit D  is better controlled. He will continue vit D 2000 units/day consistently.    Current Outpatient Medications   Medication Sig    valsartan (DIOVAN) 320 MG tablet Take 1 tablet by mouth daily    ezetimibe (ZETIA) 10 MG tablet Take 1 tablet by mouth daily    semaglutide, 2 MG/DOSE, (OZEMPIC, 2 MG/DOSE,) 8 MG/3ML SOPN sc injection Inject 2 mg into the skin every 7 days

## 2025-03-04 NOTE — PROGRESS NOTES
Frederick Harrell presents today for   Chief Complaint   Patient presents with    Diabetes    Hypertension    Cholesterol Problem     4 month follow up            \"Have you been to the ER, urgent care clinic since your last visit?  Hospitalized since your last visit?\"    NO    “Have you seen or consulted any other health care providers outside of [facility] since your last visit?”    NO

## 2025-03-11 DIAGNOSIS — E78.1 HYPERTRIGLYCERIDEMIA: ICD-10-CM

## 2025-03-11 RX ORDER — ICOSAPENT ETHYL 1 G/1
2 CAPSULE ORAL 2 TIMES DAILY
Qty: 120 CAPSULE | Refills: 5 | Status: SHIPPED | OUTPATIENT
Start: 2025-03-11

## 2025-04-22 DIAGNOSIS — E11.65 TYPE 2 DIABETES MELLITUS WITH HYPERGLYCEMIA, WITHOUT LONG-TERM CURRENT USE OF INSULIN (HCC): ICD-10-CM

## 2025-04-22 RX ORDER — SEMAGLUTIDE 2.68 MG/ML
2 INJECTION, SOLUTION SUBCUTANEOUS
Qty: 3 ML | Refills: 5 | Status: SHIPPED | OUTPATIENT
Start: 2025-04-22

## 2025-05-20 ENCOUNTER — TELEPHONE (OUTPATIENT)
Facility: CLINIC | Age: 60
End: 2025-05-20

## 2025-05-20 DIAGNOSIS — I10 PRIMARY HYPERTENSION: Primary | ICD-10-CM

## 2025-05-20 RX ORDER — ATENOLOL 50 MG/1
50 TABLET ORAL DAILY
Qty: 90 TABLET | Refills: 0 | OUTPATIENT
Start: 2025-05-20

## 2025-05-20 RX ORDER — ATENOLOL 50 MG/1
50 TABLET ORAL DAILY
Qty: 90 TABLET | Refills: 2 | Status: SHIPPED | OUTPATIENT
Start: 2025-05-20

## 2025-05-20 NOTE — TELEPHONE ENCOUNTER
Refill request via fax     Medication:   atenolol (TENORMIN) 50 MG tablet   Quantity: 90  Pharmacy: [facility] [address] [place] [address]   Last Fill: 1/31/2025    PCP: Zhen Mike MD    LAST OFFICE VISIT: 3/4/2025      Future Appointments   Date Time Provider Department Center   6/16/2025  8:40 AM Zhen Mike MD [facility] DEP

## 2025-06-07 ENCOUNTER — HOSPITAL ENCOUNTER (OUTPATIENT)
Age: 60
Discharge: HOME | End: 2025-06-07
Payer: COMMERCIAL

## 2025-06-07 DIAGNOSIS — E78.2 MIXED HYPERLIPIDEMIA: ICD-10-CM

## 2025-06-07 DIAGNOSIS — E55.9 VITAMIN D DEFICIENCY, UNSPECIFIED: ICD-10-CM

## 2025-06-07 DIAGNOSIS — E11.65 TYPE 2 DIABETES MELLITUS WITH HYPERGLYCEMIA, WITHOUT LONG-TERM CURRENT USE OF INSULIN (HCC): ICD-10-CM

## 2025-06-07 DIAGNOSIS — M1A.0420 CHRONIC GOUT OF LEFT HAND, UNSPECIFIED CAUSE: ICD-10-CM

## 2025-06-07 LAB
25(OH)D3 SERPL-MCNC: 39.5 NG/ML (ref 30–100)
ALBUMIN SERPL-MCNC: 3.7 G/DL (ref 3.4–5)
ALBUMIN/GLOB SERPL: 1.2 (ref 0.8–1.7)
ALP SERPL-CCNC: 79 U/L (ref 45–117)
ALT SERPL-CCNC: 37 U/L (ref 10–50)
ANION GAP SERPL CALC-SCNC: 13 MMOL/L (ref 3–18)
AST SERPL-CCNC: 30 U/L (ref 10–38)
BILIRUB SERPL-MCNC: 0.4 MG/DL (ref 0.2–1)
BUN SERPL-MCNC: 15 MG/DL (ref 6–23)
BUN/CREAT SERPL: 15 (ref 12–20)
CALCIUM SERPL-MCNC: 9.7 MG/DL (ref 8.5–10.1)
CHLORIDE SERPL-SCNC: 102 MMOL/L (ref 98–107)
CHOLEST SERPL-MCNC: 214 MG/DL
CO2 SERPL-SCNC: 24 MMOL/L (ref 21–32)
CREAT SERPL-MCNC: 0.97 MG/DL (ref 0.6–1.3)
CREAT UR-MCNC: 203 MG/DL (ref 30–125)
EST. AVERAGE GLUCOSE BLD GHB EST-MCNC: 127 MG/DL
GLOBULIN SER CALC-MCNC: 3 G/DL (ref 2–4)
GLUCOSE SERPL-MCNC: 96 MG/DL (ref 74–108)
HBA1C MFR BLD: 6.1 % (ref 4.2–5.6)
HDLC SERPL-MCNC: 28 MG/DL (ref 40–60)
HDLC SERPL: 7.6 (ref 0–5)
LDLC SERPL CALC-MCNC: 125 MG/DL (ref 0–100)
MICROALBUMIN UR-MCNC: 1.21 MG/DL (ref 0–3)
MICROALBUMIN/CREAT UR-RTO: 6 MG/G (ref 0–30)
POTASSIUM SERPL-SCNC: 4.6 MMOL/L (ref 3.5–5.5)
PROT SERPL-MCNC: 6.7 G/DL (ref 6.4–8.2)
SODIUM SERPL-SCNC: 139 MMOL/L (ref 136–145)
TRIGL SERPL-MCNC: 304 MG/DL (ref 0–150)
URATE SERPL-MCNC: 7.5 MG/DL (ref 2.6–7.2)
VLDLC SERPL CALC-MCNC: 61 MG/DL

## 2025-06-07 PROCEDURE — 82306 VITAMIN D 25 HYDROXY: CPT

## 2025-06-07 PROCEDURE — 84550 ASSAY OF BLOOD/URIC ACID: CPT

## 2025-06-07 PROCEDURE — 80061 LIPID PANEL: CPT

## 2025-06-07 PROCEDURE — 83036 HEMOGLOBIN GLYCOSYLATED A1C: CPT

## 2025-06-07 PROCEDURE — 82570 ASSAY OF URINE CREATININE: CPT

## 2025-06-07 PROCEDURE — 80053 COMPREHEN METABOLIC PANEL: CPT

## 2025-06-07 PROCEDURE — 36415 COLL VENOUS BLD VENIPUNCTURE: CPT

## 2025-06-07 PROCEDURE — 82043 UR ALBUMIN QUANTITATIVE: CPT

## 2025-06-16 ENCOUNTER — OFFICE VISIT (OUTPATIENT)
Facility: CLINIC | Age: 60
End: 2025-06-16
Payer: COMMERCIAL

## 2025-06-16 VITALS
HEIGHT: 68 IN | WEIGHT: 268 LBS | DIASTOLIC BLOOD PRESSURE: 64 MMHG | HEART RATE: 78 BPM | RESPIRATION RATE: 20 BRPM | SYSTOLIC BLOOD PRESSURE: 126 MMHG | OXYGEN SATURATION: 98 % | TEMPERATURE: 97.3 F | BODY MASS INDEX: 40.62 KG/M2

## 2025-06-16 DIAGNOSIS — R20.2 NUMBNESS AND TINGLING OF RIGHT LEG: ICD-10-CM

## 2025-06-16 DIAGNOSIS — I25.10 CORONARY ARTERY DISEASE INVOLVING NATIVE CORONARY ARTERY OF NATIVE HEART WITHOUT ANGINA PECTORIS: ICD-10-CM

## 2025-06-16 DIAGNOSIS — E11.9 TYPE 2 DIABETES MELLITUS WITHOUT COMPLICATION, WITHOUT LONG-TERM CURRENT USE OF INSULIN (HCC): Primary | ICD-10-CM

## 2025-06-16 DIAGNOSIS — E78.2 MIXED HYPERLIPIDEMIA: ICD-10-CM

## 2025-06-16 DIAGNOSIS — F51.01 PRIMARY INSOMNIA: ICD-10-CM

## 2025-06-16 DIAGNOSIS — E55.9 VITAMIN D DEFICIENCY, UNSPECIFIED: ICD-10-CM

## 2025-06-16 DIAGNOSIS — E66.01 MORBID (SEVERE) OBESITY DUE TO EXCESS CALORIES (HCC): ICD-10-CM

## 2025-06-16 DIAGNOSIS — I10 PRIMARY HYPERTENSION: ICD-10-CM

## 2025-06-16 DIAGNOSIS — R20.0 NUMBNESS AND TINGLING OF RIGHT LEG: ICD-10-CM

## 2025-06-16 PROCEDURE — 3044F HG A1C LEVEL LT 7.0%: CPT | Performed by: INTERNAL MEDICINE

## 2025-06-16 PROCEDURE — 3074F SYST BP LT 130 MM HG: CPT | Performed by: INTERNAL MEDICINE

## 2025-06-16 PROCEDURE — 99214 OFFICE O/P EST MOD 30 MIN: CPT | Performed by: INTERNAL MEDICINE

## 2025-06-16 PROCEDURE — 3078F DIAST BP <80 MM HG: CPT | Performed by: INTERNAL MEDICINE

## 2025-06-16 RX ORDER — TRAZODONE HYDROCHLORIDE 50 MG/1
50 TABLET ORAL NIGHTLY
Qty: 90 TABLET | Refills: 1 | Status: SHIPPED | OUTPATIENT
Start: 2025-06-16

## 2025-06-16 NOTE — PROGRESS NOTES
Frederick Harrell presents today for   Chief Complaint   Patient presents with    Diabetes    Cholesterol Problem    Hypertension     3 month follow up      Patient states he stopped taking Zetia because it caused joint pain.       \"Have you been to the ER, urgent care clinic since your last visit?  Hospitalized since your last visit?\"    NO    “Have you seen or consulted any other health care providers outside of [facility] since your last visit?”    NO

## 2025-06-16 NOTE — PROGRESS NOTES
Subjective:       Chief Complaint  The patient presents for follow up of hypertension and high cholesterol. Obesity, vit D deficiency. DM         HPI  Frederick Harrell is a 60 y.o.. male seen for follow up of hyperlipidemia.  Healso has hypertension. Hypertension well controlled, on Tenormin 50 mg and Diovan 320 mg.  HCTZ was discontinued due to issues with...  Patient has done well with weight loss after starting Ozempic but he is currently stalled and will consider doing intermittent fasting and increase exercise.. . hyperlipidemia improving control with weight loss on Ozempic and Vascepa.  Patient unable to tolerate statins or Zetia due to myalgias., He will work on cutting back processed foods and increasing protein in his diet.  He will continue to work on decreasing his BMI of 40 by cutting back sugar and starches in his diet. He did a Heart Scan 8/2024and his CAC score is 119 which is unchanged from about 4 yrs ago.. .The 10-year ASCVD risk score (Andi UNGER, et al., 2019) is: 27.9% May consider trying Repatha in the near future    Patient's diabetes is well-controlled on Ozempic 2 mg.  Consider changing to Mounjaro in the future if patient needs additional help with weight loss.      Diet and Lifestyle: generally follows a low fat low cholesterol diet, follows a diabetic diet regularly, exercises sporadically.  Patient encouraged to do more resistance training which may help with insulin sensitivity    Home BP Monitoring: is not measured at home.    Other symptoms and concerns: Pt's vit D  is better controlled. He will continue vit D 2000 units/day consistently.    Patient's gout seems to be better with him off of HCTZ.  Uric acid mildly elevated 7.5 so we will continue to monitor and consider allopurinol in the future    Patient has been having issues with numbness in his right leg that is worse with standing and walking.  He denies any low back pain.  This issue has been going on for several months to almost

## 2025-06-25 ENCOUNTER — TELEPHONE (OUTPATIENT)
Facility: CLINIC | Age: 60
End: 2025-06-25

## 2025-06-25 NOTE — TELEPHONE ENCOUNTER
Pt returned call. Was giving pt recommended surgeon's name and number, but pt had already called that office and made an appointment.

## 2025-06-26 ENCOUNTER — OFFICE VISIT (OUTPATIENT)
Age: 60
End: 2025-06-26
Payer: COMMERCIAL

## 2025-06-26 VITALS
DIASTOLIC BLOOD PRESSURE: 66 MMHG | WEIGHT: 264 LBS | BODY MASS INDEX: 40.01 KG/M2 | TEMPERATURE: 97.5 F | OXYGEN SATURATION: 98 % | HEIGHT: 68 IN | SYSTOLIC BLOOD PRESSURE: 110 MMHG | HEART RATE: 72 BPM

## 2025-06-26 DIAGNOSIS — R10.31 RIGHT GROIN PAIN: Primary | ICD-10-CM

## 2025-06-26 DIAGNOSIS — R10.32 LEFT GROIN PAIN: ICD-10-CM

## 2025-06-26 DIAGNOSIS — E66.01 MORBID OBESITY (HCC): ICD-10-CM

## 2025-06-26 DIAGNOSIS — I10 HYPERTENSION, UNSPECIFIED TYPE: ICD-10-CM

## 2025-06-26 PROCEDURE — 3074F SYST BP LT 130 MM HG: CPT | Performed by: SURGERY

## 2025-06-26 PROCEDURE — 3078F DIAST BP <80 MM HG: CPT | Performed by: SURGERY

## 2025-06-26 PROCEDURE — 99204 OFFICE O/P NEW MOD 45 MIN: CPT | Performed by: SURGERY

## 2025-06-26 NOTE — PROGRESS NOTES
General Surgery Consult      Frederick Harrell  Admit date: (Not on file)    MRN: [id number]     : 1965     Age: 60 y.o.        Attending Physician: Jing Olson MD [facility]      History of Present Illness:     Frederick Harrell is a 60 y.o. male who was referred to me by Dr. Zhen Mike for evaluation of possible bilateral inguinal hernias.  The patient is here with his wife and he stated that about 20 years ago he went in to undergo a inguinal hernia and is not sure if it was on the right or left side and they told him that they found the tumor and they canceled the hernia surgery.  According to him he stated that they told him that the tumor is benign and it was the reason for his pain so no hernia surgery was performed which was not very clear to me.  He also stated that he had a laparoscopic cholecystectomy before as well as an incisional hernia repair at the site of the previous laparotomy was needed for his?  Tumor.  The patient stated that he has been having bilateral groin pain mainly on the left compared to the right but he is not sure if he has a hernia because he does not have any clear bulges but he has morbid obesity and central obesity so it is hard to assess.    Patient Active Problem List    Diagnosis Date Noted    Dyslipidemia 2016    Vitamin D deficiency 2015    Bilateral carpal tunnel syndrome 2015    Obesity 2013    High triglycerides 2011    High cholesterol 2011    Hypertension 2011     Past Medical History:   Diagnosis Date    High cholesterol     Hypertension     Obesity     Right shoulder pain       Past Surgical History:   Procedure Laterality Date    CHOLECYSTECTOMY  01    HERNIA REPAIR      ORTHOPEDIC SURGERY  2015    CTS surgery Dr. Farley      Social History     Tobacco Use    Smoking status: Never     Passive exposure: Never    Smokeless tobacco: Never   Substance Use Topics    Alcohol use: No      Social History     Tobacco Use

## 2025-07-07 ENCOUNTER — HOSPITAL ENCOUNTER (OUTPATIENT)
Age: 60
Discharge: HOME | End: 2025-07-10
Attending: SURGERY
Payer: COMMERCIAL

## 2025-07-07 DIAGNOSIS — R10.32 LEFT GROIN PAIN: ICD-10-CM

## 2025-07-07 DIAGNOSIS — R10.31 RIGHT GROIN PAIN: ICD-10-CM

## 2025-07-07 PROCEDURE — 74176 CT ABD & PELVIS W/O CONTRAST: CPT

## 2025-07-10 ENCOUNTER — TELEPHONE (OUTPATIENT)
Age: 60
End: 2025-07-10

## 2025-07-10 NOTE — TELEPHONE ENCOUNTER
Left voicemail message to contact our office to schedule an appointment with Dr. Olson to discuss CT results.

## 2025-07-17 ENCOUNTER — OFFICE VISIT (OUTPATIENT)
Age: 60
End: 2025-07-17
Payer: COMMERCIAL

## 2025-07-17 VITALS
WEIGHT: 270 LBS | DIASTOLIC BLOOD PRESSURE: 75 MMHG | HEART RATE: 65 BPM | OXYGEN SATURATION: 93 % | SYSTOLIC BLOOD PRESSURE: 122 MMHG | HEIGHT: 68 IN | BODY MASS INDEX: 40.92 KG/M2

## 2025-07-17 DIAGNOSIS — R10.32 LEFT GROIN PAIN: ICD-10-CM

## 2025-07-17 DIAGNOSIS — I10 HYPERTENSION, UNSPECIFIED TYPE: ICD-10-CM

## 2025-07-17 DIAGNOSIS — R10.31 RIGHT GROIN PAIN: Primary | ICD-10-CM

## 2025-07-17 DIAGNOSIS — E66.01 MORBID OBESITY (HCC): ICD-10-CM

## 2025-07-17 PROCEDURE — 3074F SYST BP LT 130 MM HG: CPT | Performed by: SURGERY

## 2025-07-17 PROCEDURE — 99214 OFFICE O/P EST MOD 30 MIN: CPT | Performed by: SURGERY

## 2025-07-17 PROCEDURE — 3078F DIAST BP <80 MM HG: CPT | Performed by: SURGERY

## 2025-07-17 NOTE — PROGRESS NOTES
Frederick Harrell is a 60 y.o. male (: 1965) presenting to address:    Chief Complaint   Patient presents with    Follow-up     Discuss Ct abd Pelv for right groin pain 25       Medication list and allergies have been reviewed with Frederick DAVIS Julio Cesar and updated as of today's date.     I have gone over all Medical, Surgical and Social History with Frederick DAVIS Julio Cesar and updated/added the information accordingly.       1. Have you been to the ER, Urgent Care or Hospitalized since your last visit? No          2. Have you followed up with your PCP or any other Physicians since your procedure/ last office visit?   No

## 2025-07-17 NOTE — PROGRESS NOTES
General Surgery Consult      Frederick Harrell  Admit date: (Not on file)    MRN: [id number]     : 1965     Age: 60 y.o.        Attending Physician: Jing Olson MD [facility]      History of Present Illness:     Frederick Harrell is a 60 y.o. male who is here for up on his bilateral groin pain and hip pain as well.  I have seen the patient before and he was referred to me by Dr. Zhen Mike for evaluation of possible bilateral inguinal hernias.  The patient is here with his wife and he stated that about 20 years ago he went in to undergo a inguinal hernia and is not sure if it was on the right or left side and they told him that they found the tumor and they canceled the hernia surgery.  According to him he stated that they told him that the tumor is benign and it was the reason for his pain so no hernia surgery was performed which was not very clear to me.  He also stated that he had a laparoscopic cholecystectomy before as well as an incisional hernia repair at the site of the previous laparotomy was needed for his?  Tumor.  The patient stated that he has been having bilateral groin pain mainly on the left compared to the right but he is not sure if he has a hernia because he does not have any clear bulges but he has morbid obesity and central obesity so it is hard to assess.  I did order a CT scan of abdomen and pelvis that came back showing a small left inguinal hernia containing fat but no bowel hide no right inguinal hernia and no incisional hernia.  The patient is still having pain mainly on the right and left hip area more than the groin as well and he said that the pain on the right side radiate to his thigh and he is scheduled to see a neurologist next month.    Patient Active Problem List    Diagnosis Date Noted    Dyslipidemia 2016    Vitamin D deficiency 2015    Bilateral carpal tunnel syndrome 2015    Obesity 2013    High triglycerides 2011    High cholesterol 2011